# Patient Record
Sex: FEMALE | Race: WHITE | NOT HISPANIC OR LATINO | Employment: FULL TIME | URBAN - METROPOLITAN AREA
[De-identification: names, ages, dates, MRNs, and addresses within clinical notes are randomized per-mention and may not be internally consistent; named-entity substitution may affect disease eponyms.]

---

## 2016-08-22 LAB
EXTERNAL CHLAMYDIA SCREEN: NEGATIVE
EXTERNAL GONORRHEA SCREEN: NEGATIVE

## 2016-08-25 LAB
EXTERNAL ABO GROUPING: NORMAL
EXTERNAL ANTIBODY SCREEN: NEGATIVE
EXTERNAL HEPATITIS B SURFACE ANTIGEN: NEGATIVE
EXTERNAL RH FACTOR: POSITIVE
EXTERNAL RUBELLA QUALITATIVE: NORMAL
EXTERNAL SYPHILIS RPR SCREEN: NORMAL
HIV1 AB SPEC QL IA.RAPID: NEGATIVE

## 2017-01-05 ENCOUNTER — ROUTINE PRENATAL (OUTPATIENT)
Dept: OBSTETRICS AND GYNECOLOGY | Facility: CLINIC | Age: 37
End: 2017-01-05

## 2017-01-05 VITALS — DIASTOLIC BLOOD PRESSURE: 81 MMHG | BODY MASS INDEX: 31.83 KG/M2 | WEIGHT: 174 LBS | SYSTOLIC BLOOD PRESSURE: 132 MMHG

## 2017-01-05 DIAGNOSIS — Z34.02 ENCOUNTER FOR SUPERVISION OF NORMAL FIRST PREGNANCY IN SECOND TRIMESTER: Primary | ICD-10-CM

## 2017-01-05 PROCEDURE — 0502F SUBSEQUENT PRENATAL CARE: CPT | Performed by: OBSTETRICS & GYNECOLOGY

## 2017-01-05 NOTE — PROGRESS NOTES
OB follow up     Emely Stoner is a 36 y.o.  27w5d being seen today for her obstetrical visit.  Patient reports swelling in feet, hands, and legs. Pt also with pain on the R side of the upper abdomen radiates around to mid back. Pt also c/o being very sensitive at the top of her abdomen.  Fetal movement: normal.    Review of Systems  No bleeding, No cramping/contractions     Visit Vitals   • /81   • Wt 174 lb (78.9 kg)   • LMP 2016 (Exact Date)   • BMI 31.83 kg/m2       FHT: 144 BPM   Uterine Size: size equals dates       Assessment    1) pregnancy at 27w5d   Rh+, doing BS today.   Peds, prenatal classes, tours, TDaP/Flu and birth/anesthesia  2) edema in pregnancy (reviewed, no BP issues today).         Plan    Reviewed this stage of pregnancy  Problem list updated   Follow up in 2 weeks    Elvin Ortez MD   2017  1:51 PM

## 2017-01-05 NOTE — MR AVS SNAPSHOT
Emely Stoner   2017 2:15 PM   Routine Prenatal    Dept Phone:  [phone]   Encounter #:  [id number]    Provider:  Elvin Ortez MD   Department:  [facility]                Your Full Care Plan              Your Updated Medication List          This list is accurate as of: 17  2:41 PM.  Always use your most recent med list.                prenatal vitamin 27-0.8 27-0.8 MG tablet tablet               You Were Diagnosed With        Codes Comments    Encounter for supervision of normal first pregnancy in second trimester    -  Primary ICD-10-CM: Z34.02  ICD-9-CM: V22.0       Instructions     None    Patient Instructions History      Upcoming Appointments     Visit Type Date Time Department    OB FOLLOWUP 2017  2:15 PM ANAMARIA BREWER      [employer] Signup     [employer] allows you to send messages to your doctor, view your test results, renew your prescriptions, schedule appointments, and more. To sign up, go to SP[employer] Navigation and click on the Sign Up Now link in the New User? box. Enter your [employer] Activation Code exactly as it appears below along with the last four digits of your Social Security Number and your Date of Birth () to complete the sign-up process. If you do not sign up before the expiration date, you must request a new code.    [employer] Activation Code: [email or web address]  Expires: 2017  2:41 PM    If you have questions, you can email [email or web address] or call [phone] to talk to our [employer] staff. Remember, [employer] is NOT to be used for urgent needs. For medical emergencies, dial 911.               Other Info from Your Visit           Allergies     Keflex [Cephalexin]      Prednisone      Flagyl [Metronidazole]  Rash      Reason for Visit     Routine Prenatal Visit           Vital Signs     Blood Pressure Weight Last Menstrual Period Body Mass Index Smoking Status       132/81 174 lb  (78.9 kg) 06/25/2016 (Exact Date) 31.83 kg/m2 Former Smoker       Problems and Diagnoses Noted     Prenatal care

## 2017-01-06 ENCOUNTER — TELEPHONE (OUTPATIENT)
Dept: OBSTETRICS AND GYNECOLOGY | Facility: CLINIC | Age: 37
End: 2017-01-06

## 2017-01-06 NOTE — TELEPHONE ENCOUNTER
----- Message from Elvin Ortez MD sent at 1/6/2017  9:04 AM EST -----  Sonya, She failed her one hour. Please let her know. 3 hour ordered in [employer]  Thanks Dr. Ortez

## 2017-01-10 ENCOUNTER — TELEPHONE (OUTPATIENT)
Dept: OBSTETRICS AND GYNECOLOGY | Facility: CLINIC | Age: 37
End: 2017-01-10

## 2017-01-10 NOTE — TELEPHONE ENCOUNTER
Pt is aware.  Appt with Dr. Krysten Saha, @ 1:00 01/11/2017.  Dr. Butler did not have any open appts.

## 2017-01-10 NOTE — TELEPHONE ENCOUNTER
----- Message from Elvin Ortez MD sent at 1/10/2017 11:47 AM EST -----  Sonya, she failed her three hour and has gestational diabetes. Can we set her up with Dr. Butler for evaluation and management. Thanks Dr. Ortez

## 2017-01-11 ENCOUNTER — TRANSCRIBE ORDERS (OUTPATIENT)
Dept: DIABETES SERVICES | Facility: HOSPITAL | Age: 37
End: 2017-01-11

## 2017-01-11 DIAGNOSIS — O24.410 DIET CONTROLLED GESTATIONAL DIABETES MELLITUS (GDM) IN THIRD TRIMESTER: Primary | ICD-10-CM

## 2017-01-12 ENCOUNTER — APPOINTMENT (OUTPATIENT)
Dept: DIABETES SERVICES | Facility: HOSPITAL | Age: 37
End: 2017-01-12

## 2017-01-12 PROCEDURE — G0108 DIAB MANAGE TRN  PER INDIV: HCPCS

## 2017-01-19 ENCOUNTER — ROUTINE PRENATAL (OUTPATIENT)
Dept: OBSTETRICS AND GYNECOLOGY | Facility: CLINIC | Age: 37
End: 2017-01-19

## 2017-01-19 VITALS — DIASTOLIC BLOOD PRESSURE: 75 MMHG | SYSTOLIC BLOOD PRESSURE: 123 MMHG | BODY MASS INDEX: 31.86 KG/M2 | WEIGHT: 174.2 LBS

## 2017-01-19 DIAGNOSIS — Z34.02 ENCOUNTER FOR SUPERVISION OF NORMAL FIRST PREGNANCY IN SECOND TRIMESTER: Primary | ICD-10-CM

## 2017-01-19 DIAGNOSIS — O24.414 INSULIN CONTROLLED GESTATIONAL DIABETES MELLITUS (GDM) IN THIRD TRIMESTER: ICD-10-CM

## 2017-01-19 PROCEDURE — 0502F SUBSEQUENT PRENATAL CARE: CPT | Performed by: OBSTETRICS & GYNECOLOGY

## 2017-01-19 RX ORDER — BLOOD SUGAR DIAGNOSTIC
STRIP MISCELLANEOUS
Refills: 0 | COMMUNITY
Start: 2017-01-11 | End: 2017-03-28 | Stop reason: HOSPADM

## 2017-01-19 RX ORDER — LANCETS 33 GAUGE
EACH MISCELLANEOUS
Refills: 0 | COMMUNITY
Start: 2017-01-11 | End: 2017-04-11

## 2017-01-19 RX ORDER — PEN NEEDLE, DIABETIC 32GX 5/32"
NEEDLE, DISPOSABLE MISCELLANEOUS
Refills: 0 | COMMUNITY
Start: 2017-01-17 | End: 2017-04-11

## 2017-01-19 RX ORDER — INSULIN HUMAN 100 [IU]/ML
INJECTION, SUSPENSION SUBCUTANEOUS
COMMUNITY
Start: 2017-01-18 | End: 2017-03-28 | Stop reason: HOSPADM

## 2017-01-19 NOTE — MR AVS SNAPSHOT
Emely Stoner   2017 3:30 PM   Routine Prenatal    Dept Phone:  [phone]   Encounter #:  [id number]    Provider:  Elvin Ortez MD   Department:  [facility]                Your Full Care Plan              Your Updated Medication List          This list is accurate as of: 17  4:09 PM.  Always use your most recent med list.                BD PEN NEEDLE RD U/F 32G X 4 MM misc   Generic drug:  Insulin Pen Needle       HUMULIN N KWIKPEN 100 UNIT/ML suspension pen-injector   Generic drug:  Insulin NPH (Human) (Isophane)       ONETOUCH DELICA LANCETS 33G misc       ONETOUCH VERIO test strip   Generic drug:  glucose blood       prenatal vitamin 27-0.8 27-0.8 MG tablet tablet               You Were Diagnosed With        Codes Comments    Encounter for supervision of normal first pregnancy in second trimester    -  Primary ICD-10-CM: Z34.02  ICD-9-CM: V22.0     Insulin controlled gestational diabetes mellitus (GDM) in third trimester     ICD-10-CM: O24.414  ICD-9-CM: 648.83       Instructions     None    Patient Instructions History      Upcoming Appointments     Visit Type Date Time Department    OB FOLLOWUP 2017  3:30 PM [facility] [place]    ULTRASOUND 2017  3:00 PM [facility] [place]    OB FOLLOWUP 2017  3:30 PM [facility] [place]      My[employer] Signup     [facility] [employer] allows you to send messages to your doctor, view your test results, renew your prescriptions, schedule appointments, and more. To sign up, go to [employer] and click on the Sign Up Now link in the New User? box. Enter your UpRace Activation Code exactly as it appears below along with the last four digits of your Social Security Number and your Date of Birth () to complete the sign-up process. If you do not sign up before the expiration date, you must request a new code.    UpRace Activation Code: [email or web address]  Expires: 2017   4:09 PM    If you have questions, you can email [email or web address] or call [phone] to talk to our MyChart staff. Remember, [employer] is NOT to be used for urgent needs. For medical emergencies, dial 911.               Other Info from Your Visit           Your Appointments     Feb 02, 2017  3:00 PM EST   Ultrasound with ULTRASOUND [facility] (--)    [address]  [facility] [address] [phone]            Feb 02, 2017  3:30 PM EST   OB FOLLOWUP with Elvin Ortez MD   [facility] (--)    [address]  [facility] [address]   [phone]              Allergies     Keflex [Cephalexin]      Prednisone      Flagyl [Metronidazole]  Rash      Vital Signs     Blood Pressure Weight Last Menstrual Period Body Mass Index Smoking Status       123/75 174 lb 3.2 oz (79 kg) 06/25/2016 (Exact Date) 31.86 kg/m2 Former Smoker       Problems and Diagnoses Noted     Prenatal care    Insulin controlled gestational diabetes mellitus (GDM) in third trimester

## 2017-01-19 NOTE — PROGRESS NOTES
OB follow up     Emely Stoner is a 36 y.o.  29w5d being seen today for her obstetrical visit.  Patient reports no complaints. Fetal movement: normal.    Review of Systems  No bleeding, No cramping/contractions     Visit Vitals   • /75   • Wt 174 lb 3.2 oz (79 kg)   • LMP 2016 (Exact Date)   • BMI 31.86 kg/m2       FHT: 134 BPM   Uterine Size: size equals dates       Assessment    1) pregnancy at 29w5d   2) Gestational DM, A2 - on insulin  Dr. Krysten Saha  Fastings 110 and below, started Novulin N at night  2 hour postparandial are 120 and below for the most part  HgA1c was 5.5%  TSH was normal   Check growth         Plan    Reviewed this stage of pregnancy  Problem list updated   Follow up in 2 weeks    Elvin Ortez MD   2017  3:32 PM

## 2017-02-02 ENCOUNTER — PROCEDURE VISIT (OUTPATIENT)
Dept: OBSTETRICS AND GYNECOLOGY | Facility: CLINIC | Age: 37
End: 2017-02-02

## 2017-02-02 DIAGNOSIS — Z36.89 ENCOUNTER FOR ULTRASOUND TO CHECK FETAL GROWTH: ICD-10-CM

## 2017-02-02 DIAGNOSIS — O24.419 GESTATIONAL DIABETES MELLITUS (GDM) IN THIRD TRIMESTER, GESTATIONAL DIABETES METHOD OF CONTROL UNSPECIFIED: Primary | ICD-10-CM

## 2017-02-02 PROCEDURE — 76816 OB US FOLLOW-UP PER FETUS: CPT | Performed by: OBSTETRICS & GYNECOLOGY

## 2017-02-07 ENCOUNTER — ROUTINE PRENATAL (OUTPATIENT)
Dept: OBSTETRICS AND GYNECOLOGY | Facility: CLINIC | Age: 37
End: 2017-02-07

## 2017-02-07 VITALS — DIASTOLIC BLOOD PRESSURE: 78 MMHG | BODY MASS INDEX: 32.01 KG/M2 | SYSTOLIC BLOOD PRESSURE: 124 MMHG | WEIGHT: 175 LBS

## 2017-02-07 DIAGNOSIS — Z34.02 ENCOUNTER FOR SUPERVISION OF NORMAL FIRST PREGNANCY IN SECOND TRIMESTER: Primary | ICD-10-CM

## 2017-02-07 DIAGNOSIS — O24.414 INSULIN CONTROLLED GESTATIONAL DIABETES MELLITUS (GDM) IN THIRD TRIMESTER: ICD-10-CM

## 2017-02-07 PROCEDURE — 0502F SUBSEQUENT PRENATAL CARE: CPT | Performed by: OBSTETRICS & GYNECOLOGY

## 2017-02-07 NOTE — PROGRESS NOTES
OB follow up     Emely Stoner is a 36 y.o.  32w3d being seen today for her obstetrical visit.  Patient reports no complaints. Fetal movement: normal.    Review of Systems  No bleeding, No cramping/contractions     Visit Vitals   • /78   • Wt 175 lb (79.4 kg)   • LMP 2016 (Exact Date)   • BMI 32.01 kg/m2       FHT: 144 BPM   Uterine Size: size equals dates       Assessment    1) pregnancy at 32w3d   2) Gest DM  Seeing  Self  Reports normal BS on Novulin at night (4 units) and diet  Growth last week 55% overall  Start BPP in 2 weeks         Plan    Reviewed this stage of pregnancy  Problem list updated   Follow up in 2 weeks    Elvin Ortez MD   2017  4:12 PM

## 2017-02-23 ENCOUNTER — PROCEDURE VISIT (OUTPATIENT)
Dept: OBSTETRICS AND GYNECOLOGY | Facility: CLINIC | Age: 37
End: 2017-02-23

## 2017-02-23 ENCOUNTER — ROUTINE PRENATAL (OUTPATIENT)
Dept: OBSTETRICS AND GYNECOLOGY | Facility: CLINIC | Age: 37
End: 2017-02-23

## 2017-02-23 VITALS — DIASTOLIC BLOOD PRESSURE: 68 MMHG | WEIGHT: 174 LBS | BODY MASS INDEX: 31.83 KG/M2 | SYSTOLIC BLOOD PRESSURE: 126 MMHG

## 2017-02-23 DIAGNOSIS — O24.414 INSULIN CONTROLLED GESTATIONAL DIABETES MELLITUS (GDM) IN THIRD TRIMESTER: ICD-10-CM

## 2017-02-23 DIAGNOSIS — Z34.03 ENCOUNTER FOR SUPERVISION OF NORMAL FIRST PREGNANCY IN THIRD TRIMESTER: Primary | ICD-10-CM

## 2017-02-23 DIAGNOSIS — O24.419 GESTATIONAL DIABETES MELLITUS (GDM) IN THIRD TRIMESTER, GESTATIONAL DIABETES METHOD OF CONTROL UNSPECIFIED: Primary | ICD-10-CM

## 2017-02-23 PROCEDURE — 76819 FETAL BIOPHYS PROFIL W/O NST: CPT | Performed by: OBSTETRICS & GYNECOLOGY

## 2017-02-23 PROCEDURE — 0502F SUBSEQUENT PRENATAL CARE: CPT | Performed by: OBSTETRICS & GYNECOLOGY

## 2017-02-23 NOTE — PROGRESS NOTES
OB follow up     Emely Stoner is a 36 y.o.  34w5d being seen today for her obstetrical visit.  Patient reports no complaints. Fetal movement: normal.    Review of Systems  No bleeding, No cramping/contractions     Visit Vitals   • /68   • Wt 174 lb (78.9 kg)   • LMP 2016 (Exact Date)   • BMI 31.83 kg/m2       FHT: 134 BPM   Uterine Size: 34 cm       Assessment    1) pregnancy at 34w5d   2) Gest DM, Insulin dose is stable  BPP normal at 88, Normal MANJIT         Plan    Reviewed this stage of pregnancy  Problem list updated   Follow up in 1 weeks    Elvin Ortez MD   2017  4:17 PM

## 2017-02-28 ENCOUNTER — ROUTINE PRENATAL (OUTPATIENT)
Dept: OBSTETRICS AND GYNECOLOGY | Facility: CLINIC | Age: 37
End: 2017-02-28

## 2017-02-28 ENCOUNTER — PROCEDURE VISIT (OUTPATIENT)
Dept: OBSTETRICS AND GYNECOLOGY | Facility: CLINIC | Age: 37
End: 2017-02-28

## 2017-02-28 VITALS — DIASTOLIC BLOOD PRESSURE: 75 MMHG | BODY MASS INDEX: 32.56 KG/M2 | SYSTOLIC BLOOD PRESSURE: 114 MMHG | WEIGHT: 178 LBS

## 2017-02-28 DIAGNOSIS — Z36.9 ANTENATAL SCREENING ENCOUNTER: ICD-10-CM

## 2017-02-28 DIAGNOSIS — O24.414 INSULIN CONTROLLED GESTATIONAL DIABETES MELLITUS (GDM) IN THIRD TRIMESTER: ICD-10-CM

## 2017-02-28 DIAGNOSIS — O24.419 GESTATIONAL DIABETES MELLITUS (GDM) IN THIRD TRIMESTER, GESTATIONAL DIABETES METHOD OF CONTROL UNSPECIFIED: Primary | ICD-10-CM

## 2017-02-28 DIAGNOSIS — Z34.03 ENCOUNTER FOR SUPERVISION OF NORMAL FIRST PREGNANCY IN THIRD TRIMESTER: Primary | ICD-10-CM

## 2017-02-28 LAB — EXTERNAL GROUP B STREP ANTIGEN: NEGATIVE

## 2017-02-28 PROCEDURE — 76816 OB US FOLLOW-UP PER FETUS: CPT | Performed by: OBSTETRICS & GYNECOLOGY

## 2017-02-28 PROCEDURE — 0502F SUBSEQUENT PRENATAL CARE: CPT | Performed by: OBSTETRICS & GYNECOLOGY

## 2017-02-28 PROCEDURE — 76819 FETAL BIOPHYS PROFIL W/O NST: CPT | Performed by: OBSTETRICS & GYNECOLOGY

## 2017-02-28 NOTE — PROGRESS NOTES
Ob follow up    Emely Stoner is a 36 y.o.  35w3d patient being seen today for her obstetrical visit. Patient reports no complaints. Fetal movement: normal.      ROS - Denies leaking fluid, vaginal bleeding and notes good fetal movement.     Visit Vitals   • /75   • Wt 178 lb (80.7 kg)   • LMP 2016 (Exact Date)   • BMI 32.56 kg/m2       FHT:  143 BPM    Uterine Size: 35 cm   Presentations: cephalic   Pelvic Exam:     Dilation: Closed    Effacement: Long    Station:  -2                 Assessment    1) Pregnancy at 35w3d  2) Fetal status reassuring   3) GBS status - Allergy to keflex (Can do PCN - as has done amoxicillin in the past, I am going to check reflex due to concern they will still not use PCN at that time)   4) GDM with night insulin  Only occasional elevation to 140, due to dietary choices  AGA growth and Normal MANJIT, BPP today       Plan  GBS today  Labor warnings   [facility]        Elvin Ortez MD   2017  12:02 PM

## 2017-03-01 ENCOUNTER — TELEPHONE (OUTPATIENT)
Dept: OBSTETRICS AND GYNECOLOGY | Facility: CLINIC | Age: 37
End: 2017-03-01

## 2017-03-01 NOTE — TELEPHONE ENCOUNTER
Sonya,     Ok by me to start maternity leave at this point.   Please let her know.     Thanks   Dr. Ortez    ----- Message from Cynthia Reyer sent at 3/1/2017  4:11 PM EST -----  Pt is having extreme swelling again in her ankles and feet. She also has pain on her pelvic bone where it feels like the baby is laying. She says it's very sore. 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 She is asking if she can start maternity leave. Call back # [phone]

## 2017-03-02 LAB — GP B STREP DNA SPEC QL NAA+PROBE: NEGATIVE

## 2017-03-08 ENCOUNTER — ROUTINE PRENATAL (OUTPATIENT)
Dept: OBSTETRICS AND GYNECOLOGY | Facility: CLINIC | Age: 37
End: 2017-03-08

## 2017-03-08 ENCOUNTER — PROCEDURE VISIT (OUTPATIENT)
Dept: OBSTETRICS AND GYNECOLOGY | Facility: CLINIC | Age: 37
End: 2017-03-08

## 2017-03-08 VITALS — BODY MASS INDEX: 32.74 KG/M2 | SYSTOLIC BLOOD PRESSURE: 116 MMHG | DIASTOLIC BLOOD PRESSURE: 76 MMHG | WEIGHT: 179 LBS

## 2017-03-08 DIAGNOSIS — O24.419 GESTATIONAL DIABETES MELLITUS (GDM) IN THIRD TRIMESTER, GESTATIONAL DIABETES METHOD OF CONTROL UNSPECIFIED: Primary | ICD-10-CM

## 2017-03-08 DIAGNOSIS — O24.414 INSULIN CONTROLLED GESTATIONAL DIABETES MELLITUS (GDM) IN THIRD TRIMESTER: ICD-10-CM

## 2017-03-08 DIAGNOSIS — Z34.03 ENCOUNTER FOR SUPERVISION OF NORMAL FIRST PREGNANCY IN THIRD TRIMESTER: Primary | ICD-10-CM

## 2017-03-08 PROCEDURE — 76819 FETAL BIOPHYS PROFIL W/O NST: CPT | Performed by: OBSTETRICS & GYNECOLOGY

## 2017-03-08 PROCEDURE — 0502F SUBSEQUENT PRENATAL CARE: CPT | Performed by: OBSTETRICS & GYNECOLOGY

## 2017-03-08 NOTE — PROGRESS NOTES
Ob follow up    Emely Stoner is a 36 y.o.  36w4d patient being seen today for her obstetrical visit. Patient reports swelling in legs, ankles, and feet. Pt c/o pelvic pressure. . Fetal movement: normal.      ROS - Denies leaking fluid, vaginal bleeding and notes good fetal movement.     Visit Vitals   • /76   • Wt 179 lb (81.2 kg)   • LMP 2016 (Exact Date)   • BMI 32.74 kg/m2       FHT:  134 BPM    Uterine Size: size equals dates   Presentations: cephalic   Pelvic Exam:     Dilation: Closed    Effacement: Long    Station:  -2                 Assessment    1) Pregnancy at 36w4d  2) Fetal status reassuring   3) GBS status - negative  4) GDM  On insulin   BS doing well  BPP 8/, MANJIT 12.4 cm       Plan    Labor warnings   [facility] [place]        Elvin Ortez MD   3/8/2017  3:34 PM

## 2017-03-16 ENCOUNTER — PROCEDURE VISIT (OUTPATIENT)
Dept: OBSTETRICS AND GYNECOLOGY | Facility: CLINIC | Age: 37
End: 2017-03-16

## 2017-03-16 ENCOUNTER — ROUTINE PRENATAL (OUTPATIENT)
Dept: OBSTETRICS AND GYNECOLOGY | Facility: CLINIC | Age: 37
End: 2017-03-16

## 2017-03-16 VITALS — SYSTOLIC BLOOD PRESSURE: 119 MMHG | DIASTOLIC BLOOD PRESSURE: 76 MMHG | BODY MASS INDEX: 32.74 KG/M2 | WEIGHT: 179 LBS

## 2017-03-16 DIAGNOSIS — N89.8 VAGINAL DISCHARGE IN PREGNANCY IN THIRD TRIMESTER: ICD-10-CM

## 2017-03-16 DIAGNOSIS — Z34.03 ENCOUNTER FOR SUPERVISION OF NORMAL FIRST PREGNANCY IN THIRD TRIMESTER: Primary | ICD-10-CM

## 2017-03-16 DIAGNOSIS — O26.893 VAGINAL DISCHARGE IN PREGNANCY IN THIRD TRIMESTER: ICD-10-CM

## 2017-03-16 DIAGNOSIS — O24.414 INSULIN CONTROLLED GESTATIONAL DIABETES MELLITUS (GDM) IN THIRD TRIMESTER: ICD-10-CM

## 2017-03-16 DIAGNOSIS — O24.419 GESTATIONAL DIABETES MELLITUS (GDM) IN THIRD TRIMESTER, GESTATIONAL DIABETES METHOD OF CONTROL UNSPECIFIED: Primary | ICD-10-CM

## 2017-03-16 PROCEDURE — 76819 FETAL BIOPHYS PROFIL W/O NST: CPT | Performed by: OBSTETRICS & GYNECOLOGY

## 2017-03-16 PROCEDURE — 0502F SUBSEQUENT PRENATAL CARE: CPT | Performed by: OBSTETRICS & GYNECOLOGY

## 2017-03-16 NOTE — PROGRESS NOTES
Ob follow up    Emely Stoner is a 36 y.o.  37w5d patient being seen today for her obstetrical visit. Patient reports vaginal itching, yellow d/c, with an odor. . Fetal movement: normal.      ROS - Denies leaking fluid, vaginal bleeding and notes good fetal movement.     Visit Vitals   • /76   • Wt 179 lb (81.2 kg)   • LMP 2016 (Exact Date)   • BMI 32.74 kg/m2       FHT:  154BPM    Uterine Size: 35 cm   Presentations: cephalic   Pelvic Exam:     Dilation: Closed    Effacement: 25%    Station:  -2                 Assessment    1) Pregnancy at 37w5d  2) Fetal status reassuring   3) GBS status - negative   4) GDM - A1,   BPP 8/8, normal MANJIT  Discussed timing of induction, 3/25 & 3/27   5) Vaginal discharge  Check NuSwab - treat based on results.       Plan    Labor warnings   [facility]ID        Elvin Ortez MD   3/16/2017  3:52 PM

## 2017-03-19 LAB
A VAGINAE DNA VAG QL NAA+PROBE: NORMAL SCORE
BVAB2 DNA VAG QL NAA+PROBE: NORMAL SCORE
C ALBICANS DNA VAG QL NAA+PROBE: NEGATIVE
C GLABRATA DNA VAG QL NAA+PROBE: NEGATIVE
C TRACH RRNA SPEC QL NAA+PROBE: NEGATIVE
MEGA1 DNA VAG QL NAA+PROBE: NORMAL SCORE
N GONORRHOEA RRNA SPEC QL NAA+PROBE: NEGATIVE
T VAGINALIS RRNA SPEC QL NAA+PROBE: NEGATIVE

## 2017-03-20 ENCOUNTER — TELEPHONE (OUTPATIENT)
Dept: OBSTETRICS AND GYNECOLOGY | Facility: CLINIC | Age: 37
End: 2017-03-20

## 2017-03-20 NOTE — TELEPHONE ENCOUNTER
----- Message from Elvin Ortez MD sent at 3/20/2017 10:47 AM EDT -----  Sonya, negative vaginal culture please review. Thanks Dr. Ortez

## 2017-03-23 ENCOUNTER — ROUTINE PRENATAL (OUTPATIENT)
Dept: OBSTETRICS AND GYNECOLOGY | Facility: CLINIC | Age: 37
End: 2017-03-23

## 2017-03-23 VITALS — DIASTOLIC BLOOD PRESSURE: 77 MMHG | SYSTOLIC BLOOD PRESSURE: 117 MMHG | BODY MASS INDEX: 33.47 KG/M2 | WEIGHT: 183 LBS

## 2017-03-23 DIAGNOSIS — O24.414 INSULIN CONTROLLED GESTATIONAL DIABETES MELLITUS (GDM) IN THIRD TRIMESTER: Primary | ICD-10-CM

## 2017-03-23 DIAGNOSIS — Z34.03 ENCOUNTER FOR SUPERVISION OF NORMAL FIRST PREGNANCY IN THIRD TRIMESTER: Primary | ICD-10-CM

## 2017-03-23 DIAGNOSIS — O24.414 INSULIN CONTROLLED GESTATIONAL DIABETES MELLITUS (GDM) IN THIRD TRIMESTER: ICD-10-CM

## 2017-03-23 PROCEDURE — 0502F SUBSEQUENT PRENATAL CARE: CPT | Performed by: OBSTETRICS & GYNECOLOGY

## 2017-03-23 RX ORDER — PROMETHAZINE HYDROCHLORIDE 25 MG/ML
12.5 INJECTION, SOLUTION INTRAMUSCULAR; INTRAVENOUS EVERY 6 HOURS PRN
Status: CANCELLED | OUTPATIENT
Start: 2017-03-23

## 2017-03-23 RX ORDER — PROMETHAZINE HYDROCHLORIDE 25 MG/1
12.5 TABLET ORAL EVERY 6 HOURS PRN
Status: CANCELLED | OUTPATIENT
Start: 2017-03-23

## 2017-03-23 RX ORDER — SODIUM CHLORIDE 0.9 % (FLUSH) 0.9 %
1-10 SYRINGE (ML) INJECTION AS NEEDED
Status: CANCELLED | OUTPATIENT
Start: 2017-03-23

## 2017-03-23 RX ORDER — MISOPROSTOL 100 UG/1
800 TABLET ORAL AS NEEDED
Status: CANCELLED | OUTPATIENT
Start: 2017-03-23

## 2017-03-23 RX ORDER — METHYLERGONOVINE MALEATE 0.2 MG/ML
200 INJECTION INTRAVENOUS AS NEEDED
Status: CANCELLED | OUTPATIENT
Start: 2017-03-23

## 2017-03-23 RX ORDER — PROMETHAZINE HYDROCHLORIDE 25 MG/1
12.5 SUPPOSITORY RECTAL EVERY 6 HOURS PRN
Status: CANCELLED | OUTPATIENT
Start: 2017-03-23

## 2017-03-23 RX ORDER — CARBOPROST TROMETHAMINE 250 UG/ML
250 INJECTION, SOLUTION INTRAMUSCULAR AS NEEDED
Status: CANCELLED | OUTPATIENT
Start: 2017-03-23

## 2017-03-23 RX ORDER — MISOPROSTOL 100 UG/1
25 TABLET ORAL
Status: CANCELLED | OUTPATIENT
Start: 2017-03-23 | End: 2017-03-24

## 2017-03-23 RX ORDER — LIDOCAINE HYDROCHLORIDE 10 MG/ML
5 INJECTION, SOLUTION INFILTRATION; PERINEURAL AS NEEDED
Status: CANCELLED | OUTPATIENT
Start: 2017-03-23

## 2017-03-23 NOTE — PROGRESS NOTES
Ob follow up    Emely Stoner is a 36 y.o.  38w5d patient being seen today for her obstetrical visit. Patient reports no complaints. Fetal movement: normal.      ROS - Denies leaking fluid, vaginal bleeding and notes good fetal movement.     /77  Wt 183 lb (83 kg)  LMP 2016 (Exact Date)  BMI 33.47 kg/m2    FHT:  143 BPM    Uterine Size: size equals dates   Presentations: cephalic   Pelvic Exam:     Dilation: Closed    Effacement: 25%    Station:  -2                 Assessment    1) Pregnancy at 38w5d  2) Fetal status reassuring   3) GBS status - negative  4)GDM - insulin  Induction for Saturday       Plan    Labor warnings   [facility]        Elvin Ortez MD   3/23/2017  3:37 PM

## 2017-03-25 ENCOUNTER — ANESTHESIA (OUTPATIENT)
Dept: LABOR AND DELIVERY | Facility: HOSPITAL | Age: 37
End: 2017-03-25

## 2017-03-25 ENCOUNTER — ANESTHESIA EVENT (OUTPATIENT)
Dept: LABOR AND DELIVERY | Facility: HOSPITAL | Age: 37
End: 2017-03-25

## 2017-03-25 ENCOUNTER — HOSPITAL ENCOUNTER (INPATIENT)
Dept: LABOR AND DELIVERY | Facility: HOSPITAL | Age: 37
LOS: 3 days | Discharge: HOME | End: 2017-03-28
Attending: OBSTETRICS & GYNECOLOGY | Admitting: OBSTETRICS & GYNECOLOGY

## 2017-03-25 DIAGNOSIS — O24.414 GESTATIONAL DIABETES MELLITUS (GDM) REQUIRING INSULIN: Primary | ICD-10-CM

## 2017-03-25 DIAGNOSIS — O24.414 INSULIN CONTROLLED GESTATIONAL DIABETES MELLITUS (GDM) IN THIRD TRIMESTER: ICD-10-CM

## 2017-03-25 DIAGNOSIS — O41.1230: ICD-10-CM

## 2017-03-25 LAB
ABO GROUP BLD: NORMAL
ALBUMIN SERPL-MCNC: 3 G/DL (ref 3.5–5.2)
ALBUMIN/GLOB SERPL: 0.9 G/DL
ALP SERPL-CCNC: 104 U/L (ref 39–117)
ALT SERPL W P-5'-P-CCNC: 14 U/L (ref 1–33)
ANION GAP SERPL CALCULATED.3IONS-SCNC: 17 MMOL/L
AST SERPL-CCNC: 19 U/L (ref 1–32)
BILIRUB SERPL-MCNC: 0.4 MG/DL (ref 0.1–1.2)
BLD GP AB SCN SERPL QL: NEGATIVE
BUN BLD-MCNC: 12 MG/DL (ref 6–20)
BUN/CREAT SERPL: 16.7 (ref 7–25)
CALCIUM SPEC-SCNC: 9.1 MG/DL (ref 8.6–10.5)
CHLORIDE SERPL-SCNC: 103 MMOL/L (ref 98–107)
CO2 SERPL-SCNC: 18 MMOL/L (ref 22–29)
CREAT BLD-MCNC: 0.72 MG/DL (ref 0.57–1)
DEPRECATED RDW RBC AUTO: 46.3 FL (ref 37–54)
ERYTHROCYTE [DISTWIDTH] IN BLOOD BY AUTOMATED COUNT: 13.8 % (ref 11.7–13)
GFR SERPL CREATININE-BSD FRML MDRD: 93 ML/MIN/1.73
GLOBULIN UR ELPH-MCNC: 3.2 GM/DL
GLUCOSE BLD-MCNC: 106 MG/DL (ref 65–99)
GLUCOSE BLDC GLUCOMTR-MCNC: 106 MG/DL (ref 70–130)
GLUCOSE BLDC GLUCOMTR-MCNC: 76 MG/DL (ref 70–130)
HCT VFR BLD AUTO: 36.2 % (ref 35.6–45.5)
HGB BLD-MCNC: 11.9 G/DL (ref 11.9–15.5)
MCH RBC QN AUTO: 30.4 PG (ref 26.9–32)
MCHC RBC AUTO-ENTMCNC: 32.9 G/DL (ref 32.4–36.3)
MCV RBC AUTO: 92.3 FL (ref 80.5–98.2)
PLATELET # BLD AUTO: 140 10*3/MM3 (ref 140–500)
PMV BLD AUTO: 14.1 FL (ref 6–12)
POTASSIUM BLD-SCNC: 3.8 MMOL/L (ref 3.5–5.2)
PROT SERPL-MCNC: 6.2 G/DL (ref 6–8.5)
RBC # BLD AUTO: 3.92 10*6/MM3 (ref 3.9–5.2)
RH BLD: POSITIVE
SODIUM BLD-SCNC: 138 MMOL/L (ref 136–145)
WBC NRBC COR # BLD: 9.99 10*3/MM3 (ref 4.5–10.7)

## 2017-03-25 PROCEDURE — 80053 COMPREHEN METABOLIC PANEL: CPT | Performed by: OBSTETRICS & GYNECOLOGY

## 2017-03-25 PROCEDURE — 85027 COMPLETE CBC AUTOMATED: CPT | Performed by: OBSTETRICS & GYNECOLOGY

## 2017-03-25 PROCEDURE — 86900 BLOOD TYPING SEROLOGIC ABO: CPT | Performed by: OBSTETRICS & GYNECOLOGY

## 2017-03-25 PROCEDURE — 63710000001 DIPHENHYDRAMINE PER 50 MG

## 2017-03-25 PROCEDURE — C1755 CATHETER, INTRASPINAL: HCPCS | Performed by: ANESTHESIOLOGY

## 2017-03-25 PROCEDURE — C1755 CATHETER, INTRASPINAL: HCPCS

## 2017-03-25 PROCEDURE — 25010000002 VANCOMYCIN PER 500 MG: Performed by: OBSTETRICS & GYNECOLOGY

## 2017-03-25 PROCEDURE — 86923 COMPATIBILITY TEST ELECTRIC: CPT

## 2017-03-25 PROCEDURE — 25010000002 GENTAMICIN PER 80 MG: Performed by: OBSTETRICS & GYNECOLOGY

## 2017-03-25 PROCEDURE — 86901 BLOOD TYPING SEROLOGIC RH(D): CPT | Performed by: OBSTETRICS & GYNECOLOGY

## 2017-03-25 PROCEDURE — 86850 RBC ANTIBODY SCREEN: CPT | Performed by: OBSTETRICS & GYNECOLOGY

## 2017-03-25 PROCEDURE — 3E0P7GC INTRODUCTION OF OTHER THERAPEUTIC SUBSTANCE INTO FEMALE REPRODUCTIVE, VIA NATURAL OR ARTIFICIAL OPENING: ICD-10-PCS | Performed by: OBSTETRICS & GYNECOLOGY

## 2017-03-25 PROCEDURE — 82962 GLUCOSE BLOOD TEST: CPT

## 2017-03-25 PROCEDURE — 0DQR0ZZ REPAIR ANAL SPHINCTER, OPEN APPROACH: ICD-10-PCS | Performed by: OBSTETRICS & GYNECOLOGY

## 2017-03-25 RX ORDER — DIPHENHYDRAMINE HCL 25 MG
CAPSULE ORAL
Status: COMPLETED
Start: 2017-03-25 | End: 2017-03-25

## 2017-03-25 RX ORDER — CARBOPROST TROMETHAMINE 250 UG/ML
250 INJECTION, SOLUTION INTRAMUSCULAR AS NEEDED
Status: DISCONTINUED | OUTPATIENT
Start: 2017-03-25 | End: 2017-03-26 | Stop reason: HOSPADM

## 2017-03-25 RX ORDER — CALCIUM CARBONATE 200(500)MG
2 TABLET,CHEWABLE ORAL DAILY
COMMUNITY
End: 2017-04-11

## 2017-03-25 RX ORDER — SODIUM CHLORIDE, SODIUM LACTATE, POTASSIUM CHLORIDE, CALCIUM CHLORIDE 600; 310; 30; 20 MG/100ML; MG/100ML; MG/100ML; MG/100ML
999 INJECTION, SOLUTION INTRAVENOUS ONCE
Status: DISCONTINUED | OUTPATIENT
Start: 2017-03-25 | End: 2017-03-26

## 2017-03-25 RX ORDER — SODIUM CHLORIDE, SODIUM LACTATE, POTASSIUM CHLORIDE, CALCIUM CHLORIDE 600; 310; 30; 20 MG/100ML; MG/100ML; MG/100ML; MG/100ML
125 INJECTION, SOLUTION INTRAVENOUS CONTINUOUS
Status: DISCONTINUED | OUTPATIENT
Start: 2017-03-25 | End: 2017-03-26

## 2017-03-25 RX ORDER — MAGNESIUM HYDROXIDE/ALUMINUM HYDROXICE/SIMETHICONE 120; 1200; 1200 MG/30ML; MG/30ML; MG/30ML
SUSPENSION ORAL EVERY 6 HOURS PRN
COMMUNITY
End: 2017-04-11

## 2017-03-25 RX ORDER — ERYTHROMYCIN 5 MG/G
OINTMENT OPHTHALMIC
Status: DISPENSED
Start: 2017-03-25 | End: 2017-03-26

## 2017-03-25 RX ORDER — MISOPROSTOL 100 MCG
25 TABLET ORAL
Status: DISCONTINUED | OUTPATIENT
Start: 2017-03-25 | End: 2017-03-25

## 2017-03-25 RX ORDER — FAMOTIDINE 20 MG/1
20 TABLET, FILM COATED ORAL 2 TIMES DAILY
COMMUNITY
End: 2017-04-11

## 2017-03-25 RX ORDER — MISOPROSTOL 200 UG/1
800 TABLET ORAL AS NEEDED
Status: DISCONTINUED | OUTPATIENT
Start: 2017-03-25 | End: 2017-03-26 | Stop reason: HOSPADM

## 2017-03-25 RX ORDER — DIPHENHYDRAMINE HYDROCHLORIDE 50 MG/ML
12.5 INJECTION INTRAMUSCULAR; INTRAVENOUS EVERY 8 HOURS PRN
Status: DISCONTINUED | OUTPATIENT
Start: 2017-03-25 | End: 2017-03-26 | Stop reason: HOSPADM

## 2017-03-25 RX ORDER — PROMETHAZINE HYDROCHLORIDE 12.5 MG/1
12.5 SUPPOSITORY RECTAL EVERY 6 HOURS PRN
Status: DISCONTINUED | OUTPATIENT
Start: 2017-03-25 | End: 2017-03-26 | Stop reason: HOSPADM

## 2017-03-25 RX ORDER — DIPHENHYDRAMINE HCL 25 MG
25 CAPSULE ORAL ONCE
Status: COMPLETED | OUTPATIENT
Start: 2017-03-25 | End: 2017-03-25

## 2017-03-25 RX ORDER — SODIUM CHLORIDE, SODIUM LACTATE, POTASSIUM CHLORIDE, CALCIUM CHLORIDE 600; 310; 30; 20 MG/100ML; MG/100ML; MG/100ML; MG/100ML
999 INJECTION, SOLUTION INTRAVENOUS CONTINUOUS
Status: DISCONTINUED | OUTPATIENT
Start: 2017-03-25 | End: 2017-03-25

## 2017-03-25 RX ORDER — VANCOMYCIN HYDROCHLORIDE 1 G/200ML
1000 INJECTION, SOLUTION INTRAVENOUS ONCE
Status: COMPLETED | OUTPATIENT
Start: 2017-03-25 | End: 2017-03-25

## 2017-03-25 RX ORDER — ONDANSETRON 2 MG/ML
4 INJECTION INTRAMUSCULAR; INTRAVENOUS ONCE AS NEEDED
Status: DISCONTINUED | OUTPATIENT
Start: 2017-03-25 | End: 2017-03-26 | Stop reason: HOSPADM

## 2017-03-25 RX ORDER — OXYTOCIN/RINGER'S LACTATE 10/500ML
2-20 PLASTIC BAG, INJECTION (ML) INTRAVENOUS
Status: DISCONTINUED | OUTPATIENT
Start: 2017-03-25 | End: 2017-03-26

## 2017-03-25 RX ORDER — PROMETHAZINE HYDROCHLORIDE 25 MG/ML
12.5 INJECTION, SOLUTION INTRAMUSCULAR; INTRAVENOUS EVERY 6 HOURS PRN
Status: DISCONTINUED | OUTPATIENT
Start: 2017-03-25 | End: 2017-03-26 | Stop reason: HOSPADM

## 2017-03-25 RX ORDER — SODIUM CHLORIDE 0.9 % (FLUSH) 0.9 %
1-10 SYRINGE (ML) INJECTION AS NEEDED
Status: DISCONTINUED | OUTPATIENT
Start: 2017-03-25 | End: 2017-03-26 | Stop reason: HOSPADM

## 2017-03-25 RX ORDER — METHYLERGONOVINE MALEATE 0.2 MG/ML
200 INJECTION INTRAVENOUS AS NEEDED
Status: DISCONTINUED | OUTPATIENT
Start: 2017-03-25 | End: 2017-03-26 | Stop reason: HOSPADM

## 2017-03-25 RX ORDER — GENTAMICIN SULFATE 60 MG/50ML
120 INJECTION, SOLUTION INTRAVENOUS ONCE
Status: COMPLETED | OUTPATIENT
Start: 2017-03-25 | End: 2017-03-25

## 2017-03-25 RX ORDER — PROMETHAZINE HYDROCHLORIDE 25 MG/1
12.5 TABLET ORAL EVERY 6 HOURS PRN
Status: DISCONTINUED | OUTPATIENT
Start: 2017-03-25 | End: 2017-03-26 | Stop reason: HOSPADM

## 2017-03-25 RX ORDER — OXYTOCIN/RINGER'S LACTATE 10/500ML
999 PLASTIC BAG, INJECTION (ML) INTRAVENOUS ONCE
Status: DISCONTINUED | OUTPATIENT
Start: 2017-03-25 | End: 2017-03-26

## 2017-03-25 RX ORDER — ACETAMINOPHEN 325 MG/1
650 TABLET ORAL EVERY 6 HOURS PRN
COMMUNITY
End: 2017-03-28 | Stop reason: HOSPADM

## 2017-03-25 RX ORDER — PHYTONADIONE 1 MG/.5ML
INJECTION, EMULSION INTRAMUSCULAR; INTRAVENOUS; SUBCUTANEOUS
Status: DISPENSED
Start: 2017-03-25 | End: 2017-03-26

## 2017-03-25 RX ORDER — LIDOCAINE HYDROCHLORIDE 10 MG/ML
5 INJECTION, SOLUTION INFILTRATION; PERINEURAL AS NEEDED
Status: DISCONTINUED | OUTPATIENT
Start: 2017-03-25 | End: 2017-03-26 | Stop reason: HOSPADM

## 2017-03-25 RX ORDER — FAMOTIDINE 10 MG/ML
20 INJECTION, SOLUTION INTRAVENOUS ONCE AS NEEDED
Status: DISCONTINUED | OUTPATIENT
Start: 2017-03-25 | End: 2017-03-26 | Stop reason: HOSPADM

## 2017-03-25 RX ORDER — OXYTOCIN/RINGER'S LACTATE 10/500ML
125 PLASTIC BAG, INJECTION (ML) INTRAVENOUS CONTINUOUS
Status: DISCONTINUED | OUTPATIENT
Start: 2017-03-25 | End: 2017-03-26

## 2017-03-25 RX ORDER — GENTAMICIN SULFATE 60 MG/50ML
INJECTION, SOLUTION INTRAVENOUS
Status: DISPENSED
Start: 2017-03-25 | End: 2017-03-26

## 2017-03-25 RX ORDER — ACETAMINOPHEN 500 MG
1000 TABLET ORAL ONCE
Status: COMPLETED | OUTPATIENT
Start: 2017-03-25 | End: 2017-03-25

## 2017-03-25 RX ORDER — LIDOCAINE HYDROCHLORIDE AND EPINEPHRINE 15; 5 MG/ML; UG/ML
INJECTION, SOLUTION EPIDURAL AS NEEDED
Status: DISCONTINUED | OUTPATIENT
Start: 2017-03-25 | End: 2017-03-26 | Stop reason: SURG

## 2017-03-25 RX ADMIN — GENTAMICIN SULFATE 120 MG: 60 INJECTION, SOLUTION INTRAVENOUS at 19:32

## 2017-03-25 RX ADMIN — MISOPROSTOL 25 MCG: 100 TABLET ORAL at 07:53

## 2017-03-25 RX ADMIN — Medication 10 ML/HR: at 11:45

## 2017-03-25 RX ADMIN — SODIUM CHLORIDE, POTASSIUM CHLORIDE, SODIUM LACTATE AND CALCIUM CHLORIDE 125 ML/HR: 600; 310; 30; 20 INJECTION, SOLUTION INTRAVENOUS at 02:19

## 2017-03-25 RX ADMIN — SODIUM CHLORIDE, POTASSIUM CHLORIDE, SODIUM LACTATE AND CALCIUM CHLORIDE 125 ML/HR: 600; 310; 30; 20 INJECTION, SOLUTION INTRAVENOUS at 10:18

## 2017-03-25 RX ADMIN — MISOPROSTOL 25 MCG: 100 TABLET ORAL at 03:21

## 2017-03-25 RX ADMIN — Medication 25 MG: at 21:24

## 2017-03-25 RX ADMIN — Medication 2 MILLI-UNITS/MIN: at 15:06

## 2017-03-25 RX ADMIN — VANCOMYCIN HYDROCHLORIDE 1000 MG: 1 INJECTION, SOLUTION INTRAVENOUS at 20:14

## 2017-03-25 RX ADMIN — ACETAMINOPHEN 1000 MG: 500 TABLET ORAL at 19:02

## 2017-03-25 RX ADMIN — DIPHENHYDRAMINE HYDROCHLORIDE 25 MG: 25 CAPSULE ORAL at 21:24

## 2017-03-25 RX ADMIN — SODIUM CHLORIDE, POTASSIUM CHLORIDE, SODIUM LACTATE AND CALCIUM CHLORIDE 999 ML/HR: 600; 310; 30; 20 INJECTION, SOLUTION INTRAVENOUS at 11:27

## 2017-03-25 RX ADMIN — SODIUM CHLORIDE, POTASSIUM CHLORIDE, SODIUM LACTATE AND CALCIUM CHLORIDE 125 ML/HR: 600; 310; 30; 20 INJECTION, SOLUTION INTRAVENOUS at 18:37

## 2017-03-25 RX ADMIN — LIDOCAINE HYDROCHLORIDE AND EPINEPHRINE 3 ML: 15; 5 INJECTION, SOLUTION EPIDURAL at 11:42

## 2017-03-25 NOTE — ANESTHESIA PREPROCEDURE EVALUATION
Anesthesia Evaluation     Patient summary reviewed and Nursing notes reviewed   no history of anesthetic complications:     Airway   Mallampati: II  TM distance: >3 FB  Neck ROM: full  no difficulty expected  Dental - normal exam     Pulmonary     breath sounds clear to auscultation  (+) asthma,   (-) shortness of breath, sleep apnea, decreased breath sounds, wheezes  Cardiovascular - normal exam  Exercise tolerance: good (4-7 METS)    Rhythm: regular  Rate: normal    (-) past MI, angina, CHF, orthopnea, PND, BEDOLLA, PVD      Neuro/Psych- negative ROS  (-) seizures, neuromuscular disease, TIA, CVA, dizziness/light headedness, weakness, numbness  GI/Hepatic/Renal/Endo - negative ROS   (-) liver disease, renal disease, diabetes    Musculoskeletal (-) negative ROS    Abdominal  - normal exam   Substance History - negative use  (-) alcohol use, drug use     OB/GYN    (+) Pregnant (),         Other - negative ROS                                   Anesthesia Plan    ASA 2     epidural     Anesthetic plan and risks discussed with patient.

## 2017-03-26 LAB
BASOPHILS # BLD AUTO: 0.01 10*3/MM3 (ref 0–0.2)
BASOPHILS NFR BLD AUTO: 0.1 % (ref 0–1.5)
DEPRECATED RDW RBC AUTO: 47.1 FL (ref 37–54)
EOSINOPHIL # BLD AUTO: 0.02 10*3/MM3 (ref 0–0.7)
EOSINOPHIL NFR BLD AUTO: 0.1 % (ref 0.3–6.2)
ERYTHROCYTE [DISTWIDTH] IN BLOOD BY AUTOMATED COUNT: 14.2 % (ref 11.7–13)
GLUCOSE BLDC GLUCOMTR-MCNC: 89 MG/DL (ref 70–130)
HCT VFR BLD AUTO: 23.3 % (ref 35.6–45.5)
HGB BLD-MCNC: 7.8 G/DL (ref 11.9–15.5)
IMM GRANULOCYTES # BLD: 0.06 10*3/MM3 (ref 0–0.03)
IMM GRANULOCYTES NFR BLD: 0.3 % (ref 0–0.5)
LYMPHOCYTES # BLD AUTO: 2.07 10*3/MM3 (ref 0.9–4.8)
LYMPHOCYTES NFR BLD AUTO: 11.5 % (ref 19.6–45.3)
MCH RBC QN AUTO: 30.4 PG (ref 26.9–32)
MCHC RBC AUTO-ENTMCNC: 33.5 G/DL (ref 32.4–36.3)
MCV RBC AUTO: 90.7 FL (ref 80.5–98.2)
MONOCYTES # BLD AUTO: 0.77 10*3/MM3 (ref 0.2–1.2)
MONOCYTES NFR BLD AUTO: 4.3 % (ref 5–12)
NEUTROPHILS # BLD AUTO: 15.08 10*3/MM3 (ref 1.9–8.1)
NEUTROPHILS NFR BLD AUTO: 83.7 % (ref 42.7–76)
PLATELET # BLD AUTO: 120 10*3/MM3 (ref 140–500)
PMV BLD AUTO: 13.1 FL (ref 6–12)
RBC # BLD AUTO: 2.57 10*6/MM3 (ref 3.9–5.2)
WBC NRBC COR # BLD: 18.01 10*3/MM3 (ref 4.5–10.7)

## 2017-03-26 PROCEDURE — 85025 COMPLETE CBC W/AUTO DIFF WBC: CPT | Performed by: OBSTETRICS & GYNECOLOGY

## 2017-03-26 PROCEDURE — 82962 GLUCOSE BLOOD TEST: CPT

## 2017-03-26 PROCEDURE — 59400 OBSTETRICAL CARE: CPT | Performed by: OBSTETRICS & GYNECOLOGY

## 2017-03-26 PROCEDURE — 25010000002 GENTAMICIN PER 80 MG: Performed by: OBSTETRICS & GYNECOLOGY

## 2017-03-26 RX ORDER — HYDROCODONE BITARTRATE AND ACETAMINOPHEN 5; 325 MG/1; MG/1
2 TABLET ORAL EVERY 6 HOURS PRN
Status: DISCONTINUED | OUTPATIENT
Start: 2017-03-26 | End: 2017-03-26

## 2017-03-26 RX ORDER — DOCUSATE SODIUM 100 MG/1
100 CAPSULE, LIQUID FILLED ORAL 2 TIMES DAILY
Status: DISCONTINUED | OUTPATIENT
Start: 2017-03-26 | End: 2017-03-28 | Stop reason: HOSPADM

## 2017-03-26 RX ORDER — GENTAMICIN SULFATE 100 MG/100ML
100 INJECTION, SOLUTION INTRAVENOUS ONCE
Status: COMPLETED | OUTPATIENT
Start: 2017-03-26 | End: 2017-03-26

## 2017-03-26 RX ORDER — MISOPROSTOL 200 UG/1
600 TABLET ORAL AS NEEDED
Status: DISCONTINUED | OUTPATIENT
Start: 2017-03-26 | End: 2017-03-28 | Stop reason: HOSPADM

## 2017-03-26 RX ORDER — PRENATAL VIT NO.126/IRON/FOLIC 28MG-0.8MG
1 TABLET ORAL DAILY
Status: DISCONTINUED | OUTPATIENT
Start: 2017-03-26 | End: 2017-03-28 | Stop reason: HOSPADM

## 2017-03-26 RX ORDER — IBUPROFEN 600 MG/1
600 TABLET ORAL EVERY 8 HOURS PRN
Status: DISCONTINUED | OUTPATIENT
Start: 2017-03-26 | End: 2017-03-28 | Stop reason: HOSPADM

## 2017-03-26 RX ORDER — ZOLPIDEM TARTRATE 5 MG/1
5 TABLET ORAL NIGHTLY PRN
Status: DISCONTINUED | OUTPATIENT
Start: 2017-03-26 | End: 2017-03-28 | Stop reason: HOSPADM

## 2017-03-26 RX ORDER — PROMETHAZINE HYDROCHLORIDE 25 MG/1
12.5 TABLET ORAL EVERY 4 HOURS PRN
Status: DISCONTINUED | OUTPATIENT
Start: 2017-03-26 | End: 2017-03-28 | Stop reason: HOSPADM

## 2017-03-26 RX ORDER — LANOLIN 100 %
OINTMENT (GRAM) TOPICAL
Status: DISCONTINUED | OUTPATIENT
Start: 2017-03-26 | End: 2017-03-28 | Stop reason: HOSPADM

## 2017-03-26 RX ORDER — HYDROCODONE BITARTRATE AND ACETAMINOPHEN 5; 325 MG/1; MG/1
2 TABLET ORAL EVERY 4 HOURS PRN
Status: DISCONTINUED | OUTPATIENT
Start: 2017-03-26 | End: 2017-03-28 | Stop reason: HOSPADM

## 2017-03-26 RX ORDER — ERYTHROMYCIN 5 MG/G
OINTMENT OPHTHALMIC
Status: DISPENSED
Start: 2017-03-26 | End: 2017-03-26

## 2017-03-26 RX ORDER — CLINDAMYCIN PHOSPHATE 900 MG/50ML
900 INJECTION INTRAVENOUS ONCE
Status: COMPLETED | OUTPATIENT
Start: 2017-03-26 | End: 2017-03-26

## 2017-03-26 RX ORDER — SODIUM CHLORIDE 0.9 % (FLUSH) 0.9 %
1-10 SYRINGE (ML) INJECTION AS NEEDED
Status: DISCONTINUED | OUTPATIENT
Start: 2017-03-26 | End: 2017-03-28 | Stop reason: HOSPADM

## 2017-03-26 RX ORDER — PHYTONADIONE 1 MG/.5ML
INJECTION, EMULSION INTRAMUSCULAR; INTRAVENOUS; SUBCUTANEOUS
Status: DISPENSED
Start: 2017-03-26 | End: 2017-03-26

## 2017-03-26 RX ORDER — BISACODYL 10 MG
10 SUPPOSITORY, RECTAL RECTAL DAILY PRN
Status: DISCONTINUED | OUTPATIENT
Start: 2017-03-27 | End: 2017-03-28 | Stop reason: HOSPADM

## 2017-03-26 RX ADMIN — BENZOCAINE AND MENTHOL: 20; .5 SPRAY TOPICAL at 05:31

## 2017-03-26 RX ADMIN — HYDROCODONE BITARTRATE AND ACETAMINOPHEN 2 TABLET: 5; 325 TABLET ORAL at 17:29

## 2017-03-26 RX ADMIN — CLINDAMYCIN PHOSPHATE 900 MG: 18 INJECTION, SOLUTION INTRAMUSCULAR; INTRAVENOUS at 06:03

## 2017-03-26 RX ADMIN — DOCUSATE SODIUM 100 MG: 100 CAPSULE, LIQUID FILLED ORAL at 17:29

## 2017-03-26 RX ADMIN — HYDROCODONE BITARTRATE AND ACETAMINOPHEN 2 TABLET: 5; 325 TABLET ORAL at 12:27

## 2017-03-26 RX ADMIN — IBUPROFEN 600 MG: 600 TABLET ORAL at 19:46

## 2017-03-26 RX ADMIN — DOCUSATE SODIUM 100 MG: 100 CAPSULE, LIQUID FILLED ORAL at 08:05

## 2017-03-26 RX ADMIN — HYDROCODONE BITARTRATE AND ACETAMINOPHEN 2 TABLET: 5; 325 TABLET ORAL at 08:06

## 2017-03-26 RX ADMIN — MISOPROSTOL 800 MCG: 200 TABLET ORAL at 01:18

## 2017-03-26 RX ADMIN — HYDROCODONE BITARTRATE AND ACETAMINOPHEN 1 TABLET: 5; 325 TABLET ORAL at 03:33

## 2017-03-26 RX ADMIN — HYDROCODONE BITARTRATE AND ACETAMINOPHEN 2 TABLET: 5; 325 TABLET ORAL at 22:59

## 2017-03-26 RX ADMIN — GENTAMICIN SULFATE 100 MG: 100 INJECTION, SOLUTION INTRAVENOUS at 04:45

## 2017-03-26 RX ADMIN — IBUPROFEN 600 MG: 600 TABLET ORAL at 03:13

## 2017-03-26 RX ADMIN — HYDROCODONE BITARTRATE AND ACETAMINOPHEN 1 TABLET: 5; 325 TABLET ORAL at 04:00

## 2017-03-26 RX ADMIN — Medication 125 ML/HR: at 00:26

## 2017-03-26 RX ADMIN — IBUPROFEN 600 MG: 600 TABLET ORAL at 11:06

## 2017-03-27 PROBLEM — O41.1290 CHORIOAMNIONITIS, DELIVERED, CURRENT HOSPITALIZATION: Status: ACTIVE | Noted: 2017-03-27

## 2017-03-27 LAB
BASOPHILS # BLD AUTO: 0.01 10*3/MM3 (ref 0–0.2)
BASOPHILS # BLD AUTO: 0.02 10*3/MM3 (ref 0–0.2)
BASOPHILS NFR BLD AUTO: 0.1 % (ref 0–1.5)
BASOPHILS NFR BLD AUTO: 0.2 % (ref 0–1.5)
DEPRECATED RDW RBC AUTO: 48.2 FL (ref 37–54)
DEPRECATED RDW RBC AUTO: 49 FL (ref 37–54)
EOSINOPHIL # BLD AUTO: 0.24 10*3/MM3 (ref 0–0.7)
EOSINOPHIL # BLD AUTO: 0.27 10*3/MM3 (ref 0–0.7)
EOSINOPHIL NFR BLD AUTO: 2.4 % (ref 0.3–6.2)
EOSINOPHIL NFR BLD AUTO: 2.5 % (ref 0.3–6.2)
ERYTHROCYTE [DISTWIDTH] IN BLOOD BY AUTOMATED COUNT: 14.5 % (ref 11.7–13)
ERYTHROCYTE [DISTWIDTH] IN BLOOD BY AUTOMATED COUNT: 14.5 % (ref 11.7–13)
HCT VFR BLD AUTO: 20 % (ref 35.6–45.5)
HCT VFR BLD AUTO: 27.5 % (ref 35.6–45.5)
HGB BLD-MCNC: 6.6 G/DL (ref 11.9–15.5)
HGB BLD-MCNC: 9.2 G/DL (ref 11.9–15.5)
IMM GRANULOCYTES # BLD: 0.02 10*3/MM3 (ref 0–0.03)
IMM GRANULOCYTES # BLD: 0.03 10*3/MM3 (ref 0–0.03)
IMM GRANULOCYTES NFR BLD: 0.2 % (ref 0–0.5)
IMM GRANULOCYTES NFR BLD: 0.3 % (ref 0–0.5)
LYMPHOCYTES # BLD AUTO: 2.69 10*3/MM3 (ref 0.9–4.8)
LYMPHOCYTES # BLD AUTO: 2.92 10*3/MM3 (ref 0.9–4.8)
LYMPHOCYTES NFR BLD AUTO: 26.3 % (ref 19.6–45.3)
LYMPHOCYTES NFR BLD AUTO: 27.5 % (ref 19.6–45.3)
MCH RBC QN AUTO: 30.1 PG (ref 26.9–32)
MCH RBC QN AUTO: 31.2 PG (ref 26.9–32)
MCHC RBC AUTO-ENTMCNC: 33 G/DL (ref 32.4–36.3)
MCHC RBC AUTO-ENTMCNC: 33.5 G/DL (ref 32.4–36.3)
MCV RBC AUTO: 91.3 FL (ref 80.5–98.2)
MCV RBC AUTO: 93.2 FL (ref 80.5–98.2)
MONOCYTES # BLD AUTO: 0.63 10*3/MM3 (ref 0.2–1.2)
MONOCYTES # BLD AUTO: 0.7 10*3/MM3 (ref 0.2–1.2)
MONOCYTES NFR BLD AUTO: 6.3 % (ref 5–12)
MONOCYTES NFR BLD AUTO: 6.4 % (ref 5–12)
NEUTROPHILS # BLD AUTO: 6.19 10*3/MM3 (ref 1.9–8.1)
NEUTROPHILS # BLD AUTO: 7.15 10*3/MM3 (ref 1.9–8.1)
NEUTROPHILS NFR BLD AUTO: 63.3 % (ref 42.7–76)
NEUTROPHILS NFR BLD AUTO: 64.5 % (ref 42.7–76)
PLATELET # BLD AUTO: 115 10*3/MM3 (ref 140–500)
PLATELET # BLD AUTO: 127 10*3/MM3 (ref 140–500)
PMV BLD AUTO: 12.5 FL (ref 6–12)
PMV BLD AUTO: 12.6 FL (ref 6–12)
RBC # BLD AUTO: 2.19 10*6/MM3 (ref 3.9–5.2)
RBC # BLD AUTO: 2.95 10*6/MM3 (ref 3.9–5.2)
WBC NRBC COR # BLD: 11.09 10*3/MM3 (ref 4.5–10.7)
WBC NRBC COR # BLD: 9.78 10*3/MM3 (ref 4.5–10.7)

## 2017-03-27 PROCEDURE — 86900 BLOOD TYPING SEROLOGIC ABO: CPT

## 2017-03-27 PROCEDURE — 85025 COMPLETE CBC W/AUTO DIFF WBC: CPT | Performed by: OBSTETRICS & GYNECOLOGY

## 2017-03-27 PROCEDURE — 63710000001 DIPHENHYDRAMINE PER 50 MG: Performed by: OBSTETRICS & GYNECOLOGY

## 2017-03-27 PROCEDURE — 36430 TRANSFUSION BLD/BLD COMPNT: CPT

## 2017-03-27 PROCEDURE — 86901 BLOOD TYPING SEROLOGIC RH(D): CPT

## 2017-03-27 PROCEDURE — P9016 RBC LEUKOCYTES REDUCED: HCPCS

## 2017-03-27 RX ORDER — ACETAMINOPHEN 325 MG/1
650 TABLET ORAL ONCE
Status: DISCONTINUED | OUTPATIENT
Start: 2017-03-27 | End: 2017-03-28 | Stop reason: HOSPADM

## 2017-03-27 RX ORDER — SODIUM CHLORIDE 0.9 % (FLUSH) 0.9 %
1-10 SYRINGE (ML) INJECTION AS NEEDED
Status: DISCONTINUED | OUTPATIENT
Start: 2017-03-27 | End: 2017-03-28 | Stop reason: HOSPADM

## 2017-03-27 RX ORDER — DIPHENHYDRAMINE HCL 25 MG
25 CAPSULE ORAL ONCE
Status: COMPLETED | OUTPATIENT
Start: 2017-03-27 | End: 2017-03-27

## 2017-03-27 RX ADMIN — DOCUSATE SODIUM 100 MG: 100 CAPSULE, LIQUID FILLED ORAL at 18:29

## 2017-03-27 RX ADMIN — HYDROCODONE BITARTRATE AND ACETAMINOPHEN 2 TABLET: 5; 325 TABLET ORAL at 12:27

## 2017-03-27 RX ADMIN — IBUPROFEN 600 MG: 600 TABLET ORAL at 03:46

## 2017-03-27 RX ADMIN — IBUPROFEN 600 MG: 600 TABLET ORAL at 23:30

## 2017-03-27 RX ADMIN — Medication 1 TABLET: at 08:15

## 2017-03-27 RX ADMIN — HYDROCODONE BITARTRATE AND ACETAMINOPHEN 2 TABLET: 5; 325 TABLET ORAL at 16:27

## 2017-03-27 RX ADMIN — IBUPROFEN 600 MG: 600 TABLET ORAL at 12:27

## 2017-03-27 RX ADMIN — HYDROCODONE BITARTRATE AND ACETAMINOPHEN 2 TABLET: 5; 325 TABLET ORAL at 08:15

## 2017-03-27 RX ADMIN — DOCUSATE SODIUM 100 MG: 100 CAPSULE, LIQUID FILLED ORAL at 08:15

## 2017-03-27 RX ADMIN — HYDROCODONE BITARTRATE AND ACETAMINOPHEN 2 TABLET: 5; 325 TABLET ORAL at 23:30

## 2017-03-27 RX ADMIN — DIPHENHYDRAMINE HYDROCHLORIDE 25 MG: 25 CAPSULE ORAL at 12:27

## 2017-03-27 RX ADMIN — HYDROCODONE BITARTRATE AND ACETAMINOPHEN 2 TABLET: 5; 325 TABLET ORAL at 03:46

## 2017-03-28 VITALS
BODY MASS INDEX: 34.66 KG/M2 | HEART RATE: 91 BPM | RESPIRATION RATE: 16 BRPM | OXYGEN SATURATION: 100 % | DIASTOLIC BLOOD PRESSURE: 80 MMHG | HEIGHT: 61 IN | SYSTOLIC BLOOD PRESSURE: 123 MMHG | WEIGHT: 183.6 LBS | TEMPERATURE: 97.9 F

## 2017-03-28 LAB
ABO + RH BLD: NORMAL
ABO + RH BLD: NORMAL
BASOPHILS # BLD AUTO: 0.02 10*3/MM3 (ref 0–0.2)
BASOPHILS NFR BLD AUTO: 0.2 % (ref 0–1.5)
BH BB BLOOD EXPIRATION DATE: NORMAL
BH BB BLOOD EXPIRATION DATE: NORMAL
BH BB BLOOD TYPE BARCODE: 6200
BH BB BLOOD TYPE BARCODE: 6200
BH BB DISPENSE STATUS: NORMAL
BH BB DISPENSE STATUS: NORMAL
BH BB PRODUCT CODE: NORMAL
BH BB PRODUCT CODE: NORMAL
BH BB UNIT NUMBER: NORMAL
BH BB UNIT NUMBER: NORMAL
DEPRECATED RDW RBC AUTO: 49.7 FL (ref 37–54)
EOSINOPHIL # BLD AUTO: 0.24 10*3/MM3 (ref 0–0.7)
EOSINOPHIL NFR BLD AUTO: 2.5 % (ref 0.3–6.2)
ERYTHROCYTE [DISTWIDTH] IN BLOOD BY AUTOMATED COUNT: 14.8 % (ref 11.7–13)
HCT VFR BLD AUTO: 26.4 % (ref 35.6–45.5)
HGB BLD-MCNC: 8.9 G/DL (ref 11.9–15.5)
IMM GRANULOCYTES # BLD: 0.03 10*3/MM3 (ref 0–0.03)
IMM GRANULOCYTES NFR BLD: 0.3 % (ref 0–0.5)
LYMPHOCYTES # BLD AUTO: 2.26 10*3/MM3 (ref 0.9–4.8)
LYMPHOCYTES NFR BLD AUTO: 23.8 % (ref 19.6–45.3)
MCH RBC QN AUTO: 31 PG (ref 26.9–32)
MCHC RBC AUTO-ENTMCNC: 33.7 G/DL (ref 32.4–36.3)
MCV RBC AUTO: 92 FL (ref 80.5–98.2)
MONOCYTES # BLD AUTO: 0.49 10*3/MM3 (ref 0.2–1.2)
MONOCYTES NFR BLD AUTO: 5.2 % (ref 5–12)
NEUTROPHILS # BLD AUTO: 6.47 10*3/MM3 (ref 1.9–8.1)
NEUTROPHILS NFR BLD AUTO: 68 % (ref 42.7–76)
PLATELET # BLD AUTO: 139 10*3/MM3 (ref 140–500)
PMV BLD AUTO: 12.5 FL (ref 6–12)
RBC # BLD AUTO: 2.87 10*6/MM3 (ref 3.9–5.2)
UNIT  ABO: NORMAL
UNIT  ABO: NORMAL
UNIT  RH: NORMAL
UNIT  RH: NORMAL
WBC NRBC COR # BLD: 9.51 10*3/MM3 (ref 4.5–10.7)

## 2017-03-28 PROCEDURE — 85025 COMPLETE CBC W/AUTO DIFF WBC: CPT | Performed by: OBSTETRICS & GYNECOLOGY

## 2017-03-28 RX ORDER — HYDROCODONE BITARTRATE AND ACETAMINOPHEN 5; 325 MG/1; MG/1
2 TABLET ORAL EVERY 4 HOURS PRN
Qty: 15 TABLET | Refills: 0 | Status: SHIPPED | OUTPATIENT
Start: 2017-03-28 | End: 2017-04-11

## 2017-03-28 RX ADMIN — HYDROCODONE BITARTRATE AND ACETAMINOPHEN 2 TABLET: 5; 325 TABLET ORAL at 08:50

## 2017-03-28 RX ADMIN — HYDROCODONE BITARTRATE AND ACETAMINOPHEN 1 TABLET: 5; 325 TABLET ORAL at 13:51

## 2017-03-28 RX ADMIN — BENZOCAINE 1 APPLICATION: 5.6 OINTMENT TOPICAL at 07:29

## 2017-03-28 RX ADMIN — IBUPROFEN 600 MG: 600 TABLET ORAL at 08:50

## 2017-03-28 RX ADMIN — HYDROCODONE BITARTRATE AND ACETAMINOPHEN 2 TABLET: 5; 325 TABLET ORAL at 03:29

## 2017-03-28 RX ADMIN — Medication 1 TABLET: at 08:50

## 2017-03-28 RX ADMIN — DOCUSATE SODIUM 100 MG: 100 CAPSULE, LIQUID FILLED ORAL at 08:50

## 2017-04-05 ENCOUNTER — TELEPHONE (OUTPATIENT)
Dept: OBSTETRICS AND GYNECOLOGY | Facility: CLINIC | Age: 37
End: 2017-04-05

## 2017-04-05 NOTE — TELEPHONE ENCOUNTER
Staecy    Find out if she can come in today or tomorrow.    Thanks    Carolyne    ----- Message from Kady Negro sent at 4/5/2017 10:47 AM EDT -----  Contact: Pt  11 days PP, Neelima pt, called to report she is having a very foul smelling discharge.  Also c/o severe pain in lower abdomin, only when she urinates.  Pt concerned, please advise.     Pt #[phone]

## 2017-04-06 ENCOUNTER — POSTPARTUM VISIT (OUTPATIENT)
Dept: OBSTETRICS AND GYNECOLOGY | Facility: CLINIC | Age: 37
End: 2017-04-06

## 2017-04-06 DIAGNOSIS — N30.00 ACUTE CYSTITIS WITHOUT HEMATURIA: Primary | ICD-10-CM

## 2017-04-06 PROCEDURE — 99213 OFFICE O/P EST LOW 20 MIN: CPT | Performed by: OBSTETRICS & GYNECOLOGY

## 2017-04-10 ENCOUNTER — TELEPHONE (OUTPATIENT)
Dept: OBSTETRICS AND GYNECOLOGY | Facility: CLINIC | Age: 37
End: 2017-04-10

## 2017-04-10 NOTE — PROGRESS NOTES
Subjective   Emely Stoner is a 36 y.o. female.     Cc:  Burning with urination and vaginal discharge.    History of Present Illness - 36 year old female, recent postpartum, who presents with complaints of dysuria and frequency.  She also describes vaginal discharge with odor.  Her sutures are uncomfortable.  No fevers or chills.  Patient has not tried any OTC medications or has had a work up at another location, such as ER.    The following portions of the patient's history were reviewed and updated as appropriate:   She  has a past medical history of Abnormal Pap smear of cervix; Anxiety; Asthma; Cervical dysplasia; Chondromalacia; History of transfusion (1982); HPV (human papilloma virus) infection; Urinary tract infection; and Varicella.  She  has a past surgical history that includes Cervical biopsy w/ loop electrode excision; Mouth surgery (2014); Colonoscopy w/ biopsies and polypectomy (2014); Cystoscopy (2016); and Colposcopy (2001).  She  reports that she quit smoking about 8 months ago. Her smoking use included Cigarettes. She has a 4.75 pack-year smoking history. She has never used smokeless tobacco. She reports that she does not drink alcohol or use illicit drugs.  Current Outpatient Prescriptions   Medication Sig Dispense Refill   • aluminum-magnesium hydroxide-simethicone (MAALOX/MYLANTA) 200-200-20 MG/5ML suspension Take  by mouth Every 6 (Six) Hours As Needed for indigestion or heartburn.     • BD PEN NEEDLE RD U/F 32G X 4 MM misc inject once daily with HUMULIN N  0   • calcium carbonate (TUMS) 500 MG chewable tablet Chew 2 tablets Daily.     • famotidine (PEPCID) 20 MG tablet Take 20 mg by mouth 2 (Two) Times a Day.     • HYDROcodone-acetaminophen (NORCO) 5-325 MG per tablet Take 2 tablets by mouth Every 4 (Four) Hours As Needed for Moderate Pain (4-6). 15 tablet 0   • ONETOUCH DELICA LANCETS 33G misc USE 4 TIMES DAILY AS DIRECTED  0   • Prenatal Vit-Fe Fumarate-FA (PRENATAL VITAMIN 27-0.8)  27-0.8 MG tablet tablet Take  by mouth daily.       No current facility-administered medications for this visit.      She is allergic to keflex [cephalexin]; prednisone; vancomycin; and flagyl [metronidazole]..    Review of Systems   Constitutional: Negative for chills and fever.   Genitourinary: Positive for dysuria, frequency and vaginal discharge.       Objective   Physical Exam   Constitutional: She appears well-developed and well-nourished. No distress.   Abdominal: Soft. There is no tenderness.   Genitourinary: Pelvic exam was performed with patient supine.   Genitourinary Comments: Vulvar and perineum healing well.     Psychiatric: She has a normal mood and affect. Her behavior is normal. Judgment and thought content normal.   Vitals reviewed.      Assessment/Plan   Diagnoses and all orders for this visit:    Acute cystitis without hematuria  -  Patient is recent postpartum.  I gave her reassurance regarding vulva and healing.  Patient was given antibiotics for UTI.  She should follow up with [facility] in one week.    Manjit Barfield MD

## 2017-04-10 NOTE — TELEPHONE ENCOUNTER
----- Message from Manjit Barfield MD sent at 4/9/2017 10:26 PM EDT -----  LAW - Find out if she is feeling better.  Her culture confirmed a UTI.

## 2017-04-11 ENCOUNTER — OFFICE VISIT (OUTPATIENT)
Dept: OBSTETRICS AND GYNECOLOGY | Facility: CLINIC | Age: 37
End: 2017-04-11

## 2017-04-11 VITALS
HEART RATE: 97 BPM | DIASTOLIC BLOOD PRESSURE: 66 MMHG | WEIGHT: 152 LBS | BODY MASS INDEX: 28.7 KG/M2 | SYSTOLIC BLOOD PRESSURE: 99 MMHG | HEIGHT: 61 IN

## 2017-04-11 DIAGNOSIS — N75.0 BARTHOLIN'S GLAND CYST: ICD-10-CM

## 2017-04-11 PROCEDURE — 0503F POSTPARTUM CARE VISIT: CPT | Performed by: OBSTETRICS & GYNECOLOGY

## 2017-04-11 RX ORDER — AMOXICILLIN AND CLAVULANATE POTASSIUM 875; 125 MG/1; MG/1
TABLET, FILM COATED ORAL
Refills: 0 | COMMUNITY
Start: 2017-04-06 | End: 2017-05-10 | Stop reason: HOSPADM

## 2017-04-11 NOTE — PROGRESS NOTES
"Subjective   Emely Stoner is a 36 y.o. female for episiotomy check. Pt is s/p vaginal delivery 2017. Pt feels better since office visit on 2017.     History of Present Illness     36 y.o.    S/P delivery with partial third degree  No issues.         Review of Systems   Gastrointestinal: Negative for abdominal pain, constipation and rectal pain.   Genitourinary: Negative for pelvic pain, vaginal bleeding, vaginal discharge and vaginal pain.       Objective    BP 99/66  Pulse 97  Ht 61\" (154.9 cm)  Wt 152 lb (68.9 kg)  Breastfeeding? No  BMI 28.72 kg/m2    Physical Exam   Genitourinary: Pelvic exam was performed with patient prone. There is no lesion on the right labia. There is lesion (Bartholin enlargement on left 2-3 cm.  ) on the left labia.   Lymphadenopathy:        Right: No inguinal adenopathy present.        Left: No inguinal adenopathy present.         Assessment/Plan     1) Postpartum  Doing well  2) Bartholin enlargement, in light of recent delivery, will try sitz bath and time, return if issues persist or worsen.     Elvin Ortez MD  2017  12:07 PM               "

## 2017-04-26 ENCOUNTER — TELEPHONE (OUTPATIENT)
Dept: OBSTETRICS AND GYNECOLOGY | Facility: CLINIC | Age: 37
End: 2017-04-26

## 2017-04-26 NOTE — TELEPHONE ENCOUNTER
Sonya,     I sent zoloft to get her started, but should have appointment with us to discuss expectations of treatment, as well as natural course of this type of issue.     Thanks   Dr. Ortez    ----- Message from Sarah Cleveland sent at 4/26/2017 11:22 AM EDT -----  PT STATES SHE HAVING  ANXIETY AND DEPRESSION, MS RIOS WANTING TO KNOW IF SHE NEEDS TO BE SEEN , OR IF YOU CAN SEND RX TO PHARMACY. PLEASE ADVISE

## 2017-04-28 ENCOUNTER — APPOINTMENT (OUTPATIENT)
Dept: ULTRASOUND IMAGING | Facility: HOSPITAL | Age: 37
End: 2017-04-28
Attending: EMERGENCY MEDICINE

## 2017-04-28 ENCOUNTER — HOSPITAL ENCOUNTER (OUTPATIENT)
Facility: HOSPITAL | Age: 37
Setting detail: OBSERVATION
Discharge: HOME | End: 2017-04-29
Attending: EMERGENCY MEDICINE | Admitting: OBSTETRICS & GYNECOLOGY

## 2017-04-28 ENCOUNTER — APPOINTMENT (OUTPATIENT)
Dept: ULTRASOUND IMAGING | Facility: HOSPITAL | Age: 37
End: 2017-04-28

## 2017-04-28 ENCOUNTER — POSTPARTUM VISIT (OUTPATIENT)
Dept: OBSTETRICS AND GYNECOLOGY | Facility: CLINIC | Age: 37
End: 2017-04-28

## 2017-04-28 ENCOUNTER — TELEPHONE (OUTPATIENT)
Dept: OBSTETRICS AND GYNECOLOGY | Facility: CLINIC | Age: 37
End: 2017-04-28

## 2017-04-28 VITALS — WEIGHT: 147 LBS | HEIGHT: 61 IN | BODY MASS INDEX: 27.75 KG/M2

## 2017-04-28 LAB
ABO GROUP BLD: NORMAL
ALBUMIN SERPL-MCNC: 4.1 G/DL (ref 3.5–5.2)
ALBUMIN/GLOB SERPL: 1.6 G/DL
ALP SERPL-CCNC: 59 U/L (ref 39–117)
ALT SERPL W P-5'-P-CCNC: 19 U/L (ref 1–33)
ANION GAP SERPL CALCULATED.3IONS-SCNC: 15.7 MMOL/L
AST SERPL-CCNC: 16 U/L (ref 1–32)
BASOPHILS # BLD AUTO: 0.03 10*3/MM3 (ref 0–0.2)
BASOPHILS NFR BLD AUTO: 0.3 % (ref 0–1.5)
BILIRUB SERPL-MCNC: 0.7 MG/DL (ref 0.1–1.2)
BLD GP AB SCN SERPL QL: NEGATIVE
BUN BLD-MCNC: 16 MG/DL (ref 6–20)
BUN/CREAT SERPL: 14.8 (ref 7–25)
CALCIUM SPEC-SCNC: 8.6 MG/DL (ref 8.6–10.5)
CHLORIDE SERPL-SCNC: 104 MMOL/L (ref 98–107)
CO2 SERPL-SCNC: 22.3 MMOL/L (ref 22–29)
CREAT BLD-MCNC: 1.08 MG/DL (ref 0.57–1)
DEPRECATED RDW RBC AUTO: 43.8 FL (ref 37–54)
EOSINOPHIL # BLD AUTO: 0.24 10*3/MM3 (ref 0–0.7)
EOSINOPHIL NFR BLD AUTO: 2.7 % (ref 0.3–6.2)
ERYTHROCYTE [DISTWIDTH] IN BLOOD BY AUTOMATED COUNT: 13.5 % (ref 11.7–13)
GFR SERPL CREATININE-BSD FRML MDRD: 58 ML/MIN/1.73
GLOBULIN UR ELPH-MCNC: 2.6 GM/DL
GLUCOSE BLD-MCNC: 139 MG/DL (ref 65–99)
HCG INTACT+B SERPL-ACNC: 7.83 MIU/ML
HCT VFR BLD AUTO: 33.2 % (ref 35.6–45.5)
HGB BLD-MCNC: 10.8 G/DL (ref 11.9–15.5)
HGB BLDA-MCNC: 10.4 G/DL
IMM GRANULOCYTES # BLD: 0 10*3/MM3 (ref 0–0.03)
IMM GRANULOCYTES NFR BLD: 0 % (ref 0–0.5)
INR PPP: 1.18 (ref 0.9–1.1)
LYMPHOCYTES # BLD AUTO: 3.53 10*3/MM3 (ref 0.9–4.8)
LYMPHOCYTES NFR BLD AUTO: 40.4 % (ref 19.6–45.3)
MCH RBC QN AUTO: 28.8 PG (ref 26.9–32)
MCHC RBC AUTO-ENTMCNC: 32.5 G/DL (ref 32.4–36.3)
MCV RBC AUTO: 88.5 FL (ref 80.5–98.2)
MONOCYTES # BLD AUTO: 0.47 10*3/MM3 (ref 0.2–1.2)
MONOCYTES NFR BLD AUTO: 5.4 % (ref 5–12)
NEUTROPHILS # BLD AUTO: 4.46 10*3/MM3 (ref 1.9–8.1)
NEUTROPHILS NFR BLD AUTO: 51.2 % (ref 42.7–76)
PLATELET # BLD AUTO: 254 10*3/MM3 (ref 140–500)
PMV BLD AUTO: 12.1 FL (ref 6–12)
POTASSIUM BLD-SCNC: 4 MMOL/L (ref 3.5–5.2)
PROT SERPL-MCNC: 6.7 G/DL (ref 6–8.5)
PROTHROMBIN TIME: 14.6 SECONDS (ref 11.7–14.2)
RBC # BLD AUTO: 3.75 10*6/MM3 (ref 3.9–5.2)
RH BLD: POSITIVE
SODIUM BLD-SCNC: 142 MMOL/L (ref 136–145)
WBC NRBC COR # BLD: 8.73 10*3/MM3 (ref 4.5–10.7)

## 2017-04-28 PROCEDURE — 93976 VASCULAR STUDY: CPT

## 2017-04-28 PROCEDURE — 86901 BLOOD TYPING SEROLOGIC RH(D): CPT | Performed by: EMERGENCY MEDICINE

## 2017-04-28 PROCEDURE — 96375 TX/PRO/DX INJ NEW DRUG ADDON: CPT

## 2017-04-28 PROCEDURE — 25010000002 ONDANSETRON PER 1 MG: Performed by: EMERGENCY MEDICINE

## 2017-04-28 PROCEDURE — 86850 RBC ANTIBODY SCREEN: CPT | Performed by: EMERGENCY MEDICINE

## 2017-04-28 PROCEDURE — G0378 HOSPITAL OBSERVATION PER HR: HCPCS

## 2017-04-28 PROCEDURE — 85025 COMPLETE CBC W/AUTO DIFF WBC: CPT | Performed by: EMERGENCY MEDICINE

## 2017-04-28 PROCEDURE — 96374 THER/PROPH/DIAG INJ IV PUSH: CPT

## 2017-04-28 PROCEDURE — 80053 COMPREHEN METABOLIC PANEL: CPT | Performed by: EMERGENCY MEDICINE

## 2017-04-28 PROCEDURE — 76856 US EXAM PELVIC COMPLETE: CPT

## 2017-04-28 PROCEDURE — 85610 PROTHROMBIN TIME: CPT | Performed by: EMERGENCY MEDICINE

## 2017-04-28 PROCEDURE — 84702 CHORIONIC GONADOTROPIN TEST: CPT | Performed by: EMERGENCY MEDICINE

## 2017-04-28 PROCEDURE — 99214 OFFICE O/P EST MOD 30 MIN: CPT | Performed by: OBSTETRICS & GYNECOLOGY

## 2017-04-28 PROCEDURE — 99284 EMERGENCY DEPT VISIT MOD MDM: CPT

## 2017-04-28 PROCEDURE — 96361 HYDRATE IV INFUSION ADD-ON: CPT

## 2017-04-28 PROCEDURE — 85018 HEMOGLOBIN: CPT | Performed by: OBSTETRICS & GYNECOLOGY

## 2017-04-28 PROCEDURE — 86900 BLOOD TYPING SEROLOGIC ABO: CPT | Performed by: EMERGENCY MEDICINE

## 2017-04-28 PROCEDURE — 25010000002 KETOROLAC TROMETHAMINE PER 15 MG: Performed by: EMERGENCY MEDICINE

## 2017-04-28 PROCEDURE — 99222 1ST HOSP IP/OBS MODERATE 55: CPT | Performed by: OBSTETRICS & GYNECOLOGY

## 2017-04-28 RX ORDER — IBUPROFEN 600 MG/1
600 TABLET ORAL EVERY 6 HOURS PRN
COMMUNITY

## 2017-04-28 RX ORDER — SODIUM CHLORIDE 0.9 % (FLUSH) 0.9 %
10 SYRINGE (ML) INJECTION AS NEEDED
Status: DISCONTINUED | OUTPATIENT
Start: 2017-04-28 | End: 2017-04-29 | Stop reason: HOSPADM

## 2017-04-28 RX ORDER — HYDROCODONE BITARTRATE AND ACETAMINOPHEN 5; 325 MG/1; MG/1
1 TABLET ORAL EVERY 6 HOURS PRN
Qty: 20 TABLET | Refills: 0
Start: 2017-04-28 | End: 2017-05-10 | Stop reason: HOSPADM

## 2017-04-28 RX ORDER — KETOROLAC TROMETHAMINE 30 MG/ML
15 INJECTION, SOLUTION INTRAMUSCULAR; INTRAVENOUS ONCE
Status: COMPLETED | OUTPATIENT
Start: 2017-04-28 | End: 2017-04-28

## 2017-04-28 RX ORDER — MISOPROSTOL 200 UG/1
800 TABLET ORAL ONCE
Qty: 4 TABLET | Refills: 2 | Status: SHIPPED | OUTPATIENT
Start: 2017-04-28 | End: 2017-04-29 | Stop reason: HOSPADM

## 2017-04-28 RX ORDER — MISOPROSTOL 200 UG/1
400 TABLET ORAL EVERY 6 HOURS SCHEDULED
Status: DISCONTINUED | OUTPATIENT
Start: 2017-04-28 | End: 2017-04-29 | Stop reason: HOSPADM

## 2017-04-28 RX ORDER — ONDANSETRON 2 MG/ML
4 INJECTION INTRAMUSCULAR; INTRAVENOUS ONCE
Status: COMPLETED | OUTPATIENT
Start: 2017-04-28 | End: 2017-04-28

## 2017-04-28 RX ADMIN — MISOPROSTOL 400 MCG: 200 TABLET ORAL at 17:54

## 2017-04-28 RX ADMIN — SODIUM CHLORIDE 1000 ML: 9 INJECTION, SOLUTION INTRAVENOUS at 13:29

## 2017-04-28 RX ADMIN — ONDANSETRON 4 MG: 2 INJECTION INTRAMUSCULAR; INTRAVENOUS at 13:38

## 2017-04-28 RX ADMIN — KETOROLAC TROMETHAMINE 15 MG: 30 INJECTION, SOLUTION INTRAMUSCULAR at 13:38

## 2017-04-28 RX ADMIN — SERTRALINE 50 MG: 50 TABLET, FILM COATED ORAL at 17:54

## 2017-04-28 NOTE — TELEPHONE ENCOUNTER
----- Message from Sarah Cleveland sent at 4/27/2017  4:30 PM EDT -----  MS RIOS CALLED WITH CONCERNS OF HAVING HEAVY BLEEDING AND CLOTTING SINCE 4/25/17, SHE STATES SHE BLEEDING THROUGH 2 PADS AN HOUR BUT IT COMES AND GOES. PER JOSE ARMANDO ADVISE PT TO MONTIOR IT OR SHE CAN GO TO [facility] ER TO BE EVALUATED.

## 2017-04-28 NOTE — PROGRESS NOTES
"Subjective   Emely Stoner is a 36 y.o. female here for Post Partum exam. Pt is s/p vaginal delivery 03/25/2017. Pt is bottle feeding. Pt requests contraception, unsure which method. Pt does c/o being overwhelmed and extreme anxiety.   Pt is having heavy bleeding with clots since 04/25/2017, mild cramping.   Pt also with burning with urination.     History of Present Illness     36 y.o. female 5 weeks and one day after delivery  Start bleeding 4/25/17   Heavy cycle with clots/bright red bleeding  Still with normal discharge and lochia   Has episodes where bleeding picks up and can need to change pad every 3 hours.   But other times is more normal     Last pap: 08/22/2016 NL HPV-  The following portions of the patient's history were reviewed and updated as appropriate: allergies, current medications, past family history, past medical history, past social history, past surgical history and problem list.    Review of Systems   Gastrointestinal: Positive for abdominal pain.   Genitourinary: Positive for pelvic pain, vaginal bleeding and vaginal discharge.   Neurological: Positive for dizziness. Negative for light-headedness.   All other systems reviewed and are negative.      Objective    Ht 61\" (154.9 cm)  Wt 147 lb (66.7 kg)  Breastfeeding? No  BMI 27.78 kg/m2  Physical Exam   Constitutional: She appears well-developed and well-nourished. No distress.   Abdominal: Soft. Bowel sounds are normal. She exhibits no distension. There is no tenderness.   Genitourinary: Pelvic exam was performed with patient prone. There is no lesion on the right labia. There is no lesion on the left labia. Uterus is enlarged (8-10 weeks in size, smoothe) and tender. Uterus is not fixed. Cervix exhibits no motion tenderness. Right adnexum displays no mass and no tenderness. Left adnexum displays no mass and no tenderness. There is bleeding (cleaned serval clots and both old and new blood out of vagina ) in the vagina. No vaginal " discharge found.   Lymphadenopathy:        Right: No inguinal adenopathy present.        Left: No inguinal adenopathy present.         Assessment/Plan     1) Postpartum bleeding  Check Hg   Check ultrasound   Trial of Misoprostol  Rx for norco (due to pain with Misoprostol)   Follow up next week to review  ER warnings     Elvin Ortez MD  4/28/2017  12:11 PM        After visit, stood up and had another significant gush of blood from uterus.   Appears loss in office at  cc total based on what I am seeing  Given actively still pouring from Vagina,   Changed plan to send to [facility] ER for CBC and emergent ultrasound. May need to consider observation to know if will need blood or slow down with medication or require surgery     Elvin Ortez MD  4/28/2017  12:28 PM

## 2017-04-28 NOTE — TELEPHONE ENCOUNTER
Spoke to pt, pt having gushes of blood after sitting for a while. Appt given for pt to see us today/elvira

## 2017-04-29 VITALS
DIASTOLIC BLOOD PRESSURE: 69 MMHG | HEIGHT: 61 IN | HEART RATE: 103 BPM | OXYGEN SATURATION: 98 % | SYSTOLIC BLOOD PRESSURE: 99 MMHG | BODY MASS INDEX: 27.75 KG/M2 | WEIGHT: 147 LBS | TEMPERATURE: 98.5 F | RESPIRATION RATE: 16 BRPM

## 2017-04-29 PROBLEM — O24.414 GESTATIONAL DIABETES MELLITUS (GDM) REQUIRING INSULIN: Status: RESOLVED | Noted: 2017-03-25 | Resolved: 2017-04-29

## 2017-04-29 PROBLEM — N93.9 VAGINAL BLEEDING: Status: ACTIVE | Noted: 2017-04-29

## 2017-04-29 PROBLEM — O41.1290 CHORIOAMNIONITIS, DELIVERED, CURRENT HOSPITALIZATION: Status: RESOLVED | Noted: 2017-03-27 | Resolved: 2017-04-29

## 2017-04-29 LAB
BACTERIA UR QL AUTO: ABNORMAL /HPF
BILIRUB UR QL STRIP: NEGATIVE
CLARITY UR: ABNORMAL
COLOR UR: ABNORMAL
DEPRECATED RDW RBC AUTO: 44.5 FL (ref 37–54)
ERYTHROCYTE [DISTWIDTH] IN BLOOD BY AUTOMATED COUNT: 13.6 % (ref 11.7–13)
GLUCOSE UR STRIP-MCNC: NEGATIVE MG/DL
HCT VFR BLD AUTO: 26 % (ref 35.6–45.5)
HCT VFR BLD AUTO: 28.6 % (ref 35.6–45.5)
HGB BLD-MCNC: 8.4 G/DL (ref 11.9–15.5)
HGB BLD-MCNC: 9.3 G/DL (ref 11.9–15.5)
HGB UR QL STRIP.AUTO: ABNORMAL
HYALINE CASTS UR QL AUTO: ABNORMAL /LPF
KETONES UR QL STRIP: ABNORMAL
LEUKOCYTE ESTERASE UR QL STRIP.AUTO: ABNORMAL
MCH RBC QN AUTO: 29 PG (ref 26.9–32)
MCHC RBC AUTO-ENTMCNC: 32.3 G/DL (ref 32.4–36.3)
MCV RBC AUTO: 89.7 FL (ref 80.5–98.2)
NITRITE UR QL STRIP: NEGATIVE
PH UR STRIP.AUTO: 5.5 [PH] (ref 5–8)
PLATELET # BLD AUTO: 182 10*3/MM3 (ref 140–500)
PMV BLD AUTO: 12 FL (ref 6–12)
PROT UR QL STRIP: ABNORMAL
RBC # BLD AUTO: 2.9 10*6/MM3 (ref 3.9–5.2)
RBC # UR: ABNORMAL /HPF
REF LAB TEST METHOD: ABNORMAL
SP GR UR STRIP: 1.02 (ref 1–1.03)
SQUAMOUS #/AREA URNS HPF: ABNORMAL /HPF
UROBILINOGEN UR QL STRIP: ABNORMAL
WBC NRBC COR # BLD: 5.78 10*3/MM3 (ref 4.5–10.7)
WBC UR QL AUTO: ABNORMAL /HPF

## 2017-04-29 PROCEDURE — G0378 HOSPITAL OBSERVATION PER HR: HCPCS

## 2017-04-29 PROCEDURE — 85014 HEMATOCRIT: CPT | Performed by: OBSTETRICS & GYNECOLOGY

## 2017-04-29 PROCEDURE — 87086 URINE CULTURE/COLONY COUNT: CPT | Performed by: OBSTETRICS & GYNECOLOGY

## 2017-04-29 PROCEDURE — 99238 HOSP IP/OBS DSCHRG MGMT 30/<: CPT | Performed by: OBSTETRICS & GYNECOLOGY

## 2017-04-29 PROCEDURE — 85027 COMPLETE CBC AUTOMATED: CPT | Performed by: OBSTETRICS & GYNECOLOGY

## 2017-04-29 PROCEDURE — 85018 HEMOGLOBIN: CPT | Performed by: OBSTETRICS & GYNECOLOGY

## 2017-04-29 PROCEDURE — 81001 URINALYSIS AUTO W/SCOPE: CPT | Performed by: OBSTETRICS & GYNECOLOGY

## 2017-04-29 RX ORDER — FERROUS SULFATE 325(65) MG
325 TABLET ORAL 2 TIMES DAILY WITH MEALS
Qty: 60 TABLET | Refills: 3 | Status: SHIPPED | OUTPATIENT
Start: 2017-04-29

## 2017-04-29 RX ADMIN — SERTRALINE 50 MG: 50 TABLET, FILM COATED ORAL at 08:58

## 2017-04-29 RX ADMIN — MISOPROSTOL 400 MCG: 200 TABLET ORAL at 00:00

## 2017-04-29 RX ADMIN — MISOPROSTOL 400 MCG: 200 TABLET ORAL at 11:57

## 2017-04-29 RX ADMIN — MISOPROSTOL 400 MCG: 200 TABLET ORAL at 06:09

## 2017-04-30 ENCOUNTER — TELEPHONE (OUTPATIENT)
Dept: OBSTETRICS AND GYNECOLOGY | Facility: CLINIC | Age: 37
End: 2017-04-30

## 2017-04-30 RX ORDER — NITROFURANTOIN 25; 75 MG/1; MG/1
100 CAPSULE ORAL 2 TIMES DAILY
Qty: 10 CAPSULE | Refills: 0 | Status: SHIPPED | OUTPATIENT
Start: 2017-04-30 | End: 2017-05-05

## 2017-05-01 ENCOUNTER — POSTPARTUM VISIT (OUTPATIENT)
Dept: OBSTETRICS AND GYNECOLOGY | Facility: CLINIC | Age: 37
End: 2017-05-01

## 2017-05-01 ENCOUNTER — PROCEDURE VISIT (OUTPATIENT)
Dept: OBSTETRICS AND GYNECOLOGY | Facility: CLINIC | Age: 37
End: 2017-05-01

## 2017-05-01 VITALS
WEIGHT: 147 LBS | HEART RATE: 107 BPM | HEIGHT: 61 IN | BODY MASS INDEX: 27.75 KG/M2 | DIASTOLIC BLOOD PRESSURE: 78 MMHG | TEMPERATURE: 97.4 F | SYSTOLIC BLOOD PRESSURE: 127 MMHG

## 2017-05-01 DIAGNOSIS — N93.9 VAGINAL BLEEDING: ICD-10-CM

## 2017-05-01 LAB — BACTERIA SPEC AEROBE CULT: NORMAL

## 2017-05-01 PROCEDURE — 10120 INC&RMVL FB SUBQ TISS SMPL: CPT | Performed by: OBSTETRICS & GYNECOLOGY

## 2017-05-01 PROCEDURE — 76830 TRANSVAGINAL US NON-OB: CPT | Performed by: OBSTETRICS & GYNECOLOGY

## 2017-05-01 RX ORDER — MISOPROSTOL 100 UG/1
100 TABLET ORAL 4 TIMES DAILY
Qty: 4 TABLET | Refills: 0 | Status: SHIPPED | OUTPATIENT
Start: 2017-05-01 | End: 2017-05-10

## 2017-05-02 LAB
BASOPHILS # BLD AUTO: 0.02 10*3/MM3 (ref 0–0.2)
BASOPHILS NFR BLD AUTO: 0.4 % (ref 0–1.5)
EOSINOPHIL # BLD AUTO: 0.17 10*3/MM3 (ref 0–0.7)
EOSINOPHIL NFR BLD AUTO: 3.4 % (ref 0.3–6.2)
ERYTHROCYTE [DISTWIDTH] IN BLOOD BY AUTOMATED COUNT: 14 % (ref 11.7–13)
HCT VFR BLD AUTO: 24.5 % (ref 35.6–45.5)
HGB BLD-MCNC: 7.9 G/DL (ref 11.9–15.5)
IMM GRANULOCYTES # BLD: 0 10*3/MM3 (ref 0–0.03)
IMM GRANULOCYTES NFR BLD: 0 % (ref 0–0.5)
LYMPHOCYTES # BLD AUTO: 2.03 10*3/MM3 (ref 0.9–4.8)
LYMPHOCYTES NFR BLD AUTO: 40.1 % (ref 19.6–45.3)
MCH RBC QN AUTO: 28.8 PG (ref 26.9–32)
MCHC RBC AUTO-ENTMCNC: 32.2 G/DL (ref 32.4–36.3)
MCV RBC AUTO: 89.4 FL (ref 80.5–98.2)
MONOCYTES # BLD AUTO: 0.25 10*3/MM3 (ref 0.2–1.2)
MONOCYTES NFR BLD AUTO: 4.9 % (ref 5–12)
NEUTROPHILS # BLD AUTO: 2.59 10*3/MM3 (ref 1.9–8.1)
NEUTROPHILS NFR BLD AUTO: 51.2 % (ref 42.7–76)
PLATELET # BLD AUTO: 199 10*3/MM3 (ref 140–500)
RBC # BLD AUTO: 2.74 10*6/MM3 (ref 3.9–5.2)
WBC # BLD AUTO: 5.06 10*3/MM3 (ref 4.5–10.7)

## 2017-05-05 LAB
DX ICD CODE: NORMAL
DX ICD CODE: NORMAL
PATH REPORT.FINAL DX SPEC: NORMAL
PATH REPORT.GROSS SPEC: NORMAL
PATH REPORT.RELEVANT HX SPEC: NORMAL
PATH REPORT.SITE OF ORIGIN SPEC: NORMAL
PATHOLOGIST NAME: NORMAL
PAYMENT PROCEDURE: NORMAL

## 2017-05-10 ENCOUNTER — POSTPARTUM VISIT (OUTPATIENT)
Dept: OBSTETRICS AND GYNECOLOGY | Facility: CLINIC | Age: 37
End: 2017-05-10

## 2017-05-10 VITALS — DIASTOLIC BLOOD PRESSURE: 78 MMHG | WEIGHT: 143 LBS | BODY MASS INDEX: 27.02 KG/M2 | SYSTOLIC BLOOD PRESSURE: 124 MMHG

## 2017-05-10 DIAGNOSIS — R30.0 DYSURIA: Primary | ICD-10-CM

## 2017-05-10 DIAGNOSIS — N89.8 VAGINAL DISCHARGE: ICD-10-CM

## 2017-05-10 LAB
BILIRUB BLD-MCNC: ABNORMAL MG/DL
GLUCOSE UR STRIP-MCNC: NEGATIVE MG/DL
KETONES UR QL: NEGATIVE
LEUKOCYTE EST, POC: ABNORMAL
NITRITE UR-MCNC: NEGATIVE MG/ML
PH UR: 5.5 [PH] (ref 5–8)
PROT UR STRIP-MCNC: NEGATIVE MG/DL
RBC # UR STRIP: ABNORMAL /UL
SP GR UR: 1.03 (ref 1–1.03)
UROBILINOGEN UR QL: NORMAL

## 2017-05-10 PROCEDURE — 81003 URINALYSIS AUTO W/O SCOPE: CPT | Performed by: OBSTETRICS & GYNECOLOGY

## 2017-05-10 PROCEDURE — 0503F POSTPARTUM CARE VISIT: CPT | Performed by: OBSTETRICS & GYNECOLOGY

## 2017-05-10 RX ORDER — CLINDAMYCIN HYDROCHLORIDE 150 MG/1
150 CAPSULE ORAL 4 TIMES DAILY
Qty: 20 CAPSULE | Refills: 0 | Status: SHIPPED | OUTPATIENT
Start: 2017-05-10 | End: 2017-05-15

## 2017-05-10 RX ORDER — NORETHINDRONE ACETATE AND ETHINYL ESTRADIOL 1; .02 MG/1; MG/1
1 TABLET ORAL DAILY
Qty: 21 TABLET | Refills: 12 | Status: SHIPPED | OUTPATIENT
Start: 2017-05-10 | End: 2018-05-10

## 2017-05-12 LAB
BACTERIA UR CULT: NORMAL
BACTERIA UR CULT: NORMAL

## 2018-09-07 RX ORDER — ESTAZOLAM 2 MG/1
TABLET ORAL
Qty: 63 TABLET | Refills: 4 | OUTPATIENT
Start: 2018-09-07

## 2019-08-27 ENCOUNTER — AMBULATORY SURGICAL CENTER (OUTPATIENT)
Dept: URBAN - METROPOLITAN AREA SURGERY 20 | Facility: SURGERY | Age: 39
End: 2019-08-27

## 2019-08-27 DIAGNOSIS — Z86.010 PERSONAL HISTORY OF COLONIC POLYPS: ICD-10-CM

## 2019-08-27 PROCEDURE — 45378 DIAGNOSTIC COLONOSCOPY: CPT | Mod: 33

## 2021-04-16 ENCOUNTER — BULK ORDERING (OUTPATIENT)
Dept: CASE MANAGEMENT | Facility: OTHER | Age: 41
End: 2021-04-16

## 2021-04-16 DIAGNOSIS — Z23 IMMUNIZATION DUE: ICD-10-CM

## 2024-06-25 ENCOUNTER — OFFICE VISIT (OUTPATIENT)
Dept: FAMILY MEDICINE CLINIC | Facility: CLINIC | Age: 44
End: 2024-06-25
Payer: COMMERCIAL

## 2024-06-25 VITALS
HEIGHT: 61 IN | RESPIRATION RATE: 17 BRPM | WEIGHT: 144 LBS | BODY MASS INDEX: 27.19 KG/M2 | DIASTOLIC BLOOD PRESSURE: 70 MMHG | SYSTOLIC BLOOD PRESSURE: 99 MMHG | OXYGEN SATURATION: 98 % | HEART RATE: 78 BPM

## 2024-06-25 DIAGNOSIS — B30.9 VIRAL CONJUNCTIVITIS OF BOTH EYES: Primary | ICD-10-CM

## 2024-06-25 DIAGNOSIS — R09.81 NASAL SINUS CONGESTION: ICD-10-CM

## 2024-06-25 PROBLEM — J30.2 SEASONAL ALLERGIES: Status: ACTIVE | Noted: 2024-06-25

## 2024-06-25 PROCEDURE — 99203 OFFICE O/P NEW LOW 30 MIN: CPT | Performed by: REGISTERED NURSE

## 2024-06-25 RX ORDER — CROMOLYN SODIUM 40 MG/ML
1 SOLUTION/ DROPS OPHTHALMIC 4 TIMES DAILY
Qty: 10 ML | Refills: 1 | Status: SHIPPED | OUTPATIENT
Start: 2024-06-25 | End: 2024-06-26 | Stop reason: SDUPTHER

## 2024-06-25 RX ORDER — CETIRIZINE HYDROCHLORIDE 10 MG/1
10 TABLET ORAL DAILY
COMMUNITY
End: 2024-06-26

## 2024-06-25 RX ORDER — OFLOXACIN 3 MG/ML
SOLUTION/ DROPS OPHTHALMIC
COMMUNITY
Start: 2024-05-16

## 2024-06-25 RX ORDER — OLOPATADINE HYDROCHLORIDE 1 MG/ML
1 SOLUTION/ DROPS OPHTHALMIC 2 TIMES DAILY
Qty: 5 ML | Refills: 1 | Status: SHIPPED | OUTPATIENT
Start: 2024-06-25

## 2024-06-25 RX ORDER — LORATADINE 10 MG/1
10 TABLET ORAL
COMMUNITY
End: 2024-06-26

## 2024-06-25 NOTE — PROGRESS NOTES
"Subjective        Emely Stoner is a 44 y.o. female.     Chief Complaint   Patient presents with    Establish Care    Conjunctivitis     HPI    Pt is new to me and new to this practice. Requesting me as PCP.    \"Conjunctivitis\"- \"I've had pink eye 4 or 5 times in the past 2 1/2 months\". Children ages 7 and 1. They have not had any eye problems.  She has had some crusting along her lid line intermittently, no purulent discharge, small amount of clear watery discharge currently.  She said she has been treated at the [facility] and [facility] with ofloxacin eyedrops each time.  Nasal sinus congestion -  Use Flonase OTC 1 spray each nare twice daily for 3 days then once daily. Continue your Zyrtec 10 mg PO daily. Mild bilateral DELMY.     She has likely had viral conjunctivitis all this time and treated with otic abx drops which will not irradicate viral illness (or allergic reaction).    The following portions of the patient's history were reviewed and updated as appropriate: allergies, current medications, past family history, past medical history, past social history, past surgical history and problem list.    Review of Systems     Current Outpatient Medications:     ibuprofen (ADVIL,MOTRIN) 600 MG tablet, Take 1 tablet by mouth Every 6 (Six) Hours As Needed for Mild Pain., Disp: , Rfl:     ofloxacin (OCUFLOX) 0.3 % ophthalmic solution, , Disp: , Rfl:     nedocromil (ALOCRIL) 2 % ophthalmic solution, Administer 1 drop to both eyes 2 (Two) Times a Day. If this is unavailable, can you possibly let me know options that are in stock. Thank you so much! DH, Disp: 5 mL, Rfl: 2    olopatadine (PATANOL) 0.1 % ophthalmic solution, Administer 1 drop to both eyes 2 (Two) Times a Day., Disp: 5 mL, Rfl: 1      Objective     BP 99/70 (BP Location: Left arm, Patient Position: Sitting, Cuff Size: Adult)   Pulse 78   Resp 17   Ht 154.9 cm (61\")   Wt 65.3 kg (144 lb)   SpO2 98%   BMI 27.21 kg/m²     Physical Exam  Vitals " reviewed.   Constitutional:       Appearance: Normal appearance. She is normal weight.   HENT:      Right Ear: Ear canal and external ear normal. A middle ear effusion is present.      Left Ear: Tympanic membrane, ear canal and external ear normal.  No middle ear effusion.      Nose:      Right Turbinates: Swollen.      Left Turbinates: Not swollen.      Right Sinus: No maxillary sinus tenderness or frontal sinus tenderness.      Left Sinus: No maxillary sinus tenderness or frontal sinus tenderness.      Mouth/Throat:      Mouth: Mucous membranes are moist.      Pharynx: Uvula midline. Posterior oropharyngeal erythema present.      Comments: All abnormalities in head and neck are mild.  Eyes:      General:         Right eye: Discharge present.         Left eye: Discharge present.     Conjunctiva/sclera:      Right eye: Right conjunctiva is injected. No exudate.     Left eye: Left conjunctiva is injected. No exudate.     Pupils: Pupils are equal, round, and reactive to light.      Comments: Rt eye with more redness than left. Only clear watery discharge noted.   Cardiovascular:      Rate and Rhythm: Normal rate and regular rhythm.      Pulses: Normal pulses.      Heart sounds: Normal heart sounds.   Pulmonary:      Effort: Pulmonary effort is normal.      Breath sounds: Normal breath sounds.   Abdominal:      General: Bowel sounds are normal.      Palpations: Abdomen is soft.   Musculoskeletal:      Cervical back: Normal range of motion and neck supple. No rigidity or tenderness.      Right lower leg: No edema.      Left lower leg: No edema.   Lymphadenopathy:      Cervical: No cervical adenopathy.   Skin:     General: Skin is warm and dry.      Capillary Refill: Capillary refill takes less than 2 seconds.   Neurological:      General: No focal deficit present.      Mental Status: She is alert and oriented to person, place, and time.   Psychiatric:         Mood and Affect: Mood normal.         Behavior: Behavior  normal.         Thought Content: Thought content normal.         Judgment: Judgment normal.         Result Review :   The following data was reviewed by: BLAISE Newton on 06/25/2024:  No results found for this or any previous visit (from the past 1344 hour(s)).             Assessment & Plan    Diagnoses and all orders for this visit:    1. Viral conjunctivitis of both eyes (Primary)  -     Discontinue: Alcaftadine 0.25 % solution; Apply 1 drop to eye(s) as directed by provider 2 (Two) Times a Day.  Dispense: 5 mL; Refill: 1  -     olopatadine (PATANOL) 0.1 % ophthalmic solution; Administer 1 drop to both eyes 2 (Two) Times a Day.  Dispense: 5 mL; Refill: 1  -     Discontinue: cromolyn (OPTICROM) 4 % ophthalmic solution; Administer 1 drop to both eyes 4 (Four) Times a Day.  Dispense: 10 mL; Refill: 1    2. Nasal sinus congestion       There are no Patient Instructions on file for this visit.    Follow Up   Return in about 3 months (around 9/25/2024) for Annual physical.    Patient was given instructions and counseling regarding her condition or for health maintenance advice. Please see specific information pulled into the AVS if appropriate.      BLAISE Newton     06/26/24

## 2024-06-26 DIAGNOSIS — B30.9 VIRAL CONJUNCTIVITIS OF BOTH EYES: ICD-10-CM

## 2024-06-26 DIAGNOSIS — B30.9 VIRAL CONJUNCTIVITIS OF BOTH EYES: Primary | ICD-10-CM

## 2024-06-26 RX ORDER — CROMOLYN SODIUM 40 MG/ML
1 SOLUTION/ DROPS OPHTHALMIC 4 TIMES DAILY
Qty: 10 ML | Refills: 1 | Status: CANCELLED | OUTPATIENT
Start: 2024-06-26

## 2024-06-28 DIAGNOSIS — B30.9 VIRAL CONJUNCTIVITIS OF BOTH EYES: ICD-10-CM

## 2024-06-28 RX ORDER — CROMOLYN SODIUM 40 MG/ML
1 SOLUTION/ DROPS OPHTHALMIC 4 TIMES DAILY
Qty: 10 ML | Refills: 1 | Status: CANCELLED | OUTPATIENT
Start: 2024-06-26

## 2024-07-02 DIAGNOSIS — B30.9 VIRAL CONJUNCTIVITIS OF BOTH EYES: ICD-10-CM

## 2024-07-03 ENCOUNTER — TELEPHONE (OUTPATIENT)
Dept: FAMILY MEDICINE CLINIC | Facility: CLINIC | Age: 44
End: 2024-07-03

## 2024-07-03 RX ORDER — CROMOLYN SODIUM 40 MG/ML
1 SOLUTION/ DROPS OPHTHALMIC 4 TIMES DAILY
Qty: 10 ML | Refills: 1 | Status: SHIPPED | OUTPATIENT
Start: 2024-07-03

## 2024-07-03 NOTE — TELEPHONE ENCOUNTER
Pharmacy Name:       [facility] - [place], [place] - [address] - [phone] [facility] [phone] [facility] (Pharmacy) [phone] (Work)       Reference Number (if applicable):    REF #  [id number]    Pharmacy representative phone number: [phone]    What medication are you calling in regards to:     nedocromil (ALOCRIL) 2 % ophthalmic solution       What question does the pharmacy have: THIS MEDICATION IS NOT AVAILABLE, AND WOULD LIKE TO KNOW WHAT TO REPLACE IT WITH     Who is the provider that prescribed the medication: BLAISE GONZALEZ

## 2024-09-27 ENCOUNTER — OFFICE VISIT (OUTPATIENT)
Dept: FAMILY MEDICINE CLINIC | Facility: CLINIC | Age: 44
End: 2024-09-27
Payer: COMMERCIAL

## 2024-09-27 VITALS
HEART RATE: 78 BPM | OXYGEN SATURATION: 98 % | RESPIRATION RATE: 16 BRPM | BODY MASS INDEX: 27.75 KG/M2 | HEIGHT: 61 IN | SYSTOLIC BLOOD PRESSURE: 108 MMHG | WEIGHT: 147 LBS | TEMPERATURE: 98.6 F | DIASTOLIC BLOOD PRESSURE: 73 MMHG

## 2024-09-27 DIAGNOSIS — Z13.1 SCREENING FOR DIABETES MELLITUS (DM): ICD-10-CM

## 2024-09-27 DIAGNOSIS — Z00.00 ENCOUNTER FOR ANNUAL PHYSICAL EXAM: Primary | ICD-10-CM

## 2024-09-27 PROBLEM — Z86.32 HISTORY OF GESTATIONAL DIABETES MELLITUS: Status: ACTIVE | Noted: 2019-11-08

## 2024-09-27 PROBLEM — O09.299 H/O POSTPARTUM HEMORRHAGE, CURRENTLY PREGNANT: Status: ACTIVE | Noted: 2022-07-28

## 2024-09-27 PROBLEM — Z98.51 S/P TUBAL LIGATION: Status: ACTIVE | Noted: 2023-02-08

## 2024-09-27 PROBLEM — J45.909 ASTHMA: Status: ACTIVE | Noted: 2024-09-27

## 2024-09-27 PROBLEM — Z86.0100 HISTORY OF COLONIC POLYPS: Status: ACTIVE | Noted: 2024-09-27

## 2024-09-27 PROBLEM — Z98.890 HISTORY OF LOOP ELECTROSURGICAL EXCISION PROCEDURE (LEEP) OF CERVIX AFFECTING PREGNANCY, ANTEPARTUM: Status: ACTIVE | Noted: 2022-07-28

## 2024-09-27 PROBLEM — O34.40 HISTORY OF LOOP ELECTROSURGICAL EXCISION PROCEDURE (LEEP) OF CERVIX AFFECTING PREGNANCY, ANTEPARTUM: Status: ACTIVE | Noted: 2022-07-28

## 2024-09-27 PROBLEM — D62 ACUTE BLOOD LOSS ANEMIA: Status: ACTIVE | Noted: 2023-02-09

## 2024-09-27 PROBLEM — O09.299 H/O MATERNAL FOURTH DEGREE PERINEAL LACERATION, CURRENTLY PREGNANT: Status: ACTIVE | Noted: 2022-07-28

## 2024-09-27 PROBLEM — Z86.010 HISTORY OF COLONIC POLYPS: Status: ACTIVE | Noted: 2024-09-27

## 2024-09-27 PROCEDURE — 99396 PREV VISIT EST AGE 40-64: CPT | Performed by: REGISTERED NURSE

## 2024-09-27 RX ORDER — CETIRIZINE HYDROCHLORIDE 10 MG/1
10 TABLET ORAL DAILY
COMMUNITY

## 2024-11-01 ENCOUNTER — TELEPHONE (OUTPATIENT)
Dept: FAMILY MEDICINE CLINIC | Facility: CLINIC | Age: 44
End: 2024-11-01
Payer: COMMERCIAL

## 2024-12-23 ENCOUNTER — TELEPHONE (OUTPATIENT)
Dept: FAMILY MEDICINE CLINIC | Facility: CLINIC | Age: 44
End: 2024-12-23

## 2024-12-23 DIAGNOSIS — N76.0 BV (BACTERIAL VAGINOSIS): Primary | ICD-10-CM

## 2024-12-23 DIAGNOSIS — B96.89 BV (BACTERIAL VAGINOSIS): Primary | ICD-10-CM

## 2024-12-23 RX ORDER — METRONIDAZOLE 500 MG/1
500 TABLET ORAL 2 TIMES DAILY
Qty: 14 TABLET | Refills: 0 | Status: SHIPPED | OUTPATIENT
Start: 2024-12-23 | End: 2024-12-31

## 2024-12-23 NOTE — TELEPHONE ENCOUNTER
Caller: Emely Stoner    Relationship: Self    Best call back number:[phone]    What medication are you requesting: ANTIBIOTIC    What are your current symptoms: symptoms of BV- vaginal itching, odor, clear discharge.     How long have you been experiencing symptoms: 2 DAYS    Have you had these symptoms before:    [x] Yes  [] No    Have you been treated for these symptoms before:   [x] Yes  [] No    If a prescription is needed, what is your preferred pharmacy and phone number: [facility] - [place]     Additional notes:

## 2025-02-12 ENCOUNTER — LAB (OUTPATIENT)
Dept: LAB | Facility: HOSPITAL | Age: 45
End: 2025-02-12
Payer: COMMERCIAL

## 2025-02-12 DIAGNOSIS — Z13.1 SCREENING FOR DIABETES MELLITUS (DM): ICD-10-CM

## 2025-02-12 DIAGNOSIS — Z00.00 ENCOUNTER FOR ANNUAL PHYSICAL EXAM: ICD-10-CM

## 2025-02-12 LAB
ALBUMIN SERPL-MCNC: 4.1 G/DL (ref 3.5–5.2)
ALBUMIN/GLOB SERPL: 1.4 G/DL
ALP SERPL-CCNC: 48 U/L (ref 39–117)
ALT SERPL W P-5'-P-CCNC: 11 U/L (ref 1–33)
ANION GAP SERPL CALCULATED.3IONS-SCNC: 9.4 MMOL/L (ref 5–15)
AST SERPL-CCNC: 13 U/L (ref 1–32)
BASOPHILS # BLD AUTO: 0.03 10*3/MM3 (ref 0–0.2)
BASOPHILS NFR BLD AUTO: 0.5 % (ref 0–1.5)
BILIRUB SERPL-MCNC: 1 MG/DL (ref 0–1.2)
BUN SERPL-MCNC: 13 MG/DL (ref 6–20)
BUN/CREAT SERPL: 16.5 (ref 7–25)
CALCIUM SPEC-SCNC: 9.1 MG/DL (ref 8.6–10.5)
CHLORIDE SERPL-SCNC: 104 MMOL/L (ref 98–107)
CHOLEST SERPL-MCNC: 190 MG/DL (ref 0–200)
CO2 SERPL-SCNC: 24.6 MMOL/L (ref 22–29)
CREAT SERPL-MCNC: 0.79 MG/DL (ref 0.57–1)
DEPRECATED RDW RBC AUTO: 40 FL (ref 37–54)
EGFRCR SERPLBLD CKD-EPI 2021: 95.9 ML/MIN/1.73
EOSINOPHIL # BLD AUTO: 0.12 10*3/MM3 (ref 0–0.4)
EOSINOPHIL NFR BLD AUTO: 1.9 % (ref 0.3–6.2)
ERYTHROCYTE [DISTWIDTH] IN BLOOD BY AUTOMATED COUNT: 11.9 % (ref 12.3–15.4)
GLOBULIN UR ELPH-MCNC: 2.9 GM/DL
GLUCOSE SERPL-MCNC: 100 MG/DL (ref 65–99)
HBA1C MFR BLD: 4.7 % (ref 4.8–5.6)
HCT VFR BLD AUTO: 41.4 % (ref 34–46.6)
HDLC SERPL-MCNC: 46 MG/DL (ref 40–60)
HGB BLD-MCNC: 14.1 G/DL (ref 12–15.9)
IMM GRANULOCYTES # BLD AUTO: 0.02 10*3/MM3 (ref 0–0.05)
IMM GRANULOCYTES NFR BLD AUTO: 0.3 % (ref 0–0.5)
LDLC SERPL CALC-MCNC: 130 MG/DL (ref 0–100)
LDLC/HDLC SERPL: 2.81 {RATIO}
LYMPHOCYTES # BLD AUTO: 1.96 10*3/MM3 (ref 0.7–3.1)
LYMPHOCYTES NFR BLD AUTO: 30.7 % (ref 19.6–45.3)
MCH RBC QN AUTO: 31.1 PG (ref 26.6–33)
MCHC RBC AUTO-ENTMCNC: 34.1 G/DL (ref 31.5–35.7)
MCV RBC AUTO: 91.2 FL (ref 79–97)
MONOCYTES # BLD AUTO: 0.31 10*3/MM3 (ref 0.1–0.9)
MONOCYTES NFR BLD AUTO: 4.9 % (ref 5–12)
NEUTROPHILS NFR BLD AUTO: 3.95 10*3/MM3 (ref 1.7–7)
NEUTROPHILS NFR BLD AUTO: 61.7 % (ref 42.7–76)
NRBC BLD AUTO-RTO: 0 /100 WBC (ref 0–0.2)
PLATELET # BLD AUTO: 200 10*3/MM3 (ref 140–450)
PMV BLD AUTO: 12.1 FL (ref 6–12)
POTASSIUM SERPL-SCNC: 4.6 MMOL/L (ref 3.5–5.2)
PROT SERPL-MCNC: 7 G/DL (ref 6–8.5)
RBC # BLD AUTO: 4.54 10*6/MM3 (ref 3.77–5.28)
SODIUM SERPL-SCNC: 138 MMOL/L (ref 136–145)
TRIGL SERPL-MCNC: 74 MG/DL (ref 0–150)
TSH SERPL DL<=0.05 MIU/L-ACNC: 1.36 UIU/ML (ref 0.27–4.2)
VLDLC SERPL-MCNC: 14 MG/DL (ref 5–40)
WBC NRBC COR # BLD AUTO: 6.39 10*3/MM3 (ref 3.4–10.8)

## 2025-02-12 PROCEDURE — 80050 GENERAL HEALTH PANEL: CPT

## 2025-02-12 PROCEDURE — 83036 HEMOGLOBIN GLYCOSYLATED A1C: CPT

## 2025-02-12 PROCEDURE — 36415 COLL VENOUS BLD VENIPUNCTURE: CPT

## 2025-02-12 PROCEDURE — 80061 LIPID PANEL: CPT

## 2025-06-24 ENCOUNTER — TELEPHONE (OUTPATIENT)
Dept: FAMILY MEDICINE CLINIC | Facility: CLINIC | Age: 45
End: 2025-06-24
Payer: COMMERCIAL

## 2025-06-24 NOTE — TELEPHONE ENCOUNTER
Hub ok to relay   Called patient. Left voicemail to let her know that Chalrene already completed the form for her. She can come to the office to pickup.